# Patient Record
Sex: FEMALE | Race: OTHER | NOT HISPANIC OR LATINO | URBAN - METROPOLITAN AREA
[De-identification: names, ages, dates, MRNs, and addresses within clinical notes are randomized per-mention and may not be internally consistent; named-entity substitution may affect disease eponyms.]

---

## 2018-05-30 ENCOUNTER — EMERGENCY (EMERGENCY)
Facility: HOSPITAL | Age: 78
LOS: 1 days | Discharge: HOME | End: 2018-05-30
Attending: EMERGENCY MEDICINE | Admitting: EMERGENCY MEDICINE
Payer: MEDICARE

## 2018-05-30 VITALS
SYSTOLIC BLOOD PRESSURE: 113 MMHG | TEMPERATURE: 98 F | WEIGHT: 199.96 LBS | HEART RATE: 58 BPM | DIASTOLIC BLOOD PRESSURE: 69 MMHG | OXYGEN SATURATION: 97 % | RESPIRATION RATE: 17 BRPM

## 2018-05-30 VITALS
DIASTOLIC BLOOD PRESSURE: 59 MMHG | RESPIRATION RATE: 17 BRPM | OXYGEN SATURATION: 97 % | HEART RATE: 66 BPM | SYSTOLIC BLOOD PRESSURE: 90 MMHG

## 2018-05-30 DIAGNOSIS — I10 ESSENTIAL (PRIMARY) HYPERTENSION: ICD-10-CM

## 2018-05-30 DIAGNOSIS — I25.110 ATHEROSCLEROTIC HEART DISEASE OF NATIVE CORONARY ARTERY WITH UNSTABLE ANGINA PECTORIS: ICD-10-CM

## 2018-05-30 DIAGNOSIS — M79.662 PAIN IN LEFT LOWER LEG: ICD-10-CM

## 2018-05-30 DIAGNOSIS — I48.92 UNSPECIFIED ATRIAL FLUTTER: ICD-10-CM

## 2018-05-30 DIAGNOSIS — E11.9 TYPE 2 DIABETES MELLITUS WITHOUT COMPLICATIONS: ICD-10-CM

## 2018-05-30 DIAGNOSIS — L03.116 CELLULITIS OF LEFT LOWER LIMB: ICD-10-CM

## 2018-05-30 LAB
ALBUMIN SERPL ELPH-MCNC: 2.9 G/DL — LOW (ref 3.4–5)
ALP SERPL-CCNC: 98 U/L — SIGNIFICANT CHANGE UP (ref 40–120)
ALT FLD-CCNC: 16 U/L — SIGNIFICANT CHANGE UP (ref 12–42)
ANION GAP SERPL CALC-SCNC: 10 MMOL/L — SIGNIFICANT CHANGE UP (ref 9–16)
APTT BLD: 34 SEC — SIGNIFICANT CHANGE UP (ref 27.5–36.5)
AST SERPL-CCNC: 21 U/L — SIGNIFICANT CHANGE UP (ref 15–37)
BASOPHILS NFR BLD AUTO: 0.2 % — SIGNIFICANT CHANGE UP (ref 0–2)
BILIRUB SERPL-MCNC: 0.9 MG/DL — SIGNIFICANT CHANGE UP (ref 0.2–1.2)
BUN SERPL-MCNC: 65 MG/DL — HIGH (ref 7–23)
CALCIUM SERPL-MCNC: 9.6 MG/DL — SIGNIFICANT CHANGE UP (ref 8.5–10.5)
CHLORIDE SERPL-SCNC: 92 MMOL/L — LOW (ref 96–108)
CO2 SERPL-SCNC: 27 MMOL/L — SIGNIFICANT CHANGE UP (ref 22–31)
CREAT SERPL-MCNC: 1.74 MG/DL — HIGH (ref 0.5–1.3)
EOSINOPHIL NFR BLD AUTO: 0.2 % — SIGNIFICANT CHANGE UP (ref 0–6)
GLUCOSE SERPL-MCNC: 131 MG/DL — HIGH (ref 70–99)
HCT VFR BLD CALC: 30.3 % — LOW (ref 34.5–45)
HGB BLD-MCNC: 9.8 G/DL — LOW (ref 11.5–15.5)
IMM GRANULOCYTES NFR BLD AUTO: 0.4 % — SIGNIFICANT CHANGE UP (ref 0–1.5)
INR BLD: 1.2 — HIGH (ref 0.88–1.16)
LACTATE SERPL-SCNC: 1 MMOL/L — SIGNIFICANT CHANGE UP (ref 0.4–2)
LYMPHOCYTES # BLD AUTO: 5.6 % — LOW (ref 13–44)
MCHC RBC-ENTMCNC: 23.9 PG — LOW (ref 27–34)
MCHC RBC-ENTMCNC: 32.3 G/DL — SIGNIFICANT CHANGE UP (ref 32–36)
MCV RBC AUTO: 73.9 FL — LOW (ref 80–100)
MONOCYTES NFR BLD AUTO: 7.3 % — SIGNIFICANT CHANGE UP (ref 2–14)
NEUTROPHILS NFR BLD AUTO: 86.3 % — HIGH (ref 43–77)
PLATELET # BLD AUTO: 255 K/UL — SIGNIFICANT CHANGE UP (ref 150–400)
POTASSIUM SERPL-MCNC: 3.4 MMOL/L — LOW (ref 3.5–5.3)
POTASSIUM SERPL-SCNC: 3.4 MMOL/L — LOW (ref 3.5–5.3)
PROT SERPL-MCNC: 7.8 G/DL — SIGNIFICANT CHANGE UP (ref 6.4–8.2)
PROTHROM AB SERPL-ACNC: 13.2 SEC — HIGH (ref 9.8–12.7)
RBC # BLD: 4.1 M/UL — SIGNIFICANT CHANGE UP (ref 3.8–5.2)
RBC # FLD: 15.7 % — SIGNIFICANT CHANGE UP (ref 10.3–16.9)
SODIUM SERPL-SCNC: 129 MMOL/L — LOW (ref 132–145)
WBC # BLD: 9.6 K/UL — SIGNIFICANT CHANGE UP (ref 3.8–10.5)
WBC # FLD AUTO: 9.6 K/UL — SIGNIFICANT CHANGE UP (ref 3.8–10.5)

## 2018-05-30 PROCEDURE — 73590 X-RAY EXAM OF LOWER LEG: CPT | Mod: 26,50

## 2018-05-30 PROCEDURE — 99285 EMERGENCY DEPT VISIT HI MDM: CPT | Mod: 25

## 2018-05-30 PROCEDURE — 93010 ELECTROCARDIOGRAM REPORT: CPT

## 2018-05-30 RX ORDER — VANCOMYCIN HCL 1 G
1000 VIAL (EA) INTRAVENOUS ONCE
Qty: 0 | Refills: 0 | Status: COMPLETED | OUTPATIENT
Start: 2018-05-30 | End: 2018-05-30

## 2018-05-30 RX ORDER — SODIUM CHLORIDE 9 MG/ML
1000 INJECTION INTRAMUSCULAR; INTRAVENOUS; SUBCUTANEOUS
Qty: 0 | Refills: 0 | Status: DISCONTINUED | OUTPATIENT
Start: 2018-05-30 | End: 2018-06-03

## 2018-05-30 RX ORDER — POTASSIUM CHLORIDE 20 MEQ
20 PACKET (EA) ORAL ONCE
Qty: 0 | Refills: 0 | Status: COMPLETED | OUTPATIENT
Start: 2018-05-30 | End: 2018-05-30

## 2018-05-30 RX ORDER — PIPERACILLIN AND TAZOBACTAM 4; .5 G/20ML; G/20ML
4.5 INJECTION, POWDER, LYOPHILIZED, FOR SOLUTION INTRAVENOUS ONCE
Qty: 0 | Refills: 0 | Status: COMPLETED | OUTPATIENT
Start: 2018-05-30 | End: 2018-05-30

## 2018-05-30 RX ORDER — ASPIRIN/CALCIUM CARB/MAGNESIUM 324 MG
325 TABLET ORAL ONCE
Qty: 0 | Refills: 0 | Status: COMPLETED | OUTPATIENT
Start: 2018-05-30 | End: 2018-05-30

## 2018-05-30 RX ORDER — SODIUM CHLORIDE 9 MG/ML
1000 INJECTION INTRAMUSCULAR; INTRAVENOUS; SUBCUTANEOUS ONCE
Qty: 0 | Refills: 0 | Status: COMPLETED | OUTPATIENT
Start: 2018-05-30 | End: 2018-05-30

## 2018-05-30 RX ADMIN — PIPERACILLIN AND TAZOBACTAM 200 GRAM(S): 4; .5 INJECTION, POWDER, LYOPHILIZED, FOR SOLUTION INTRAVENOUS at 18:23

## 2018-05-30 RX ADMIN — SODIUM CHLORIDE 2000 MILLILITER(S): 9 INJECTION INTRAMUSCULAR; INTRAVENOUS; SUBCUTANEOUS at 20:02

## 2018-05-30 RX ADMIN — SODIUM CHLORIDE 250 MILLILITER(S): 9 INJECTION INTRAMUSCULAR; INTRAVENOUS; SUBCUTANEOUS at 17:16

## 2018-05-30 RX ADMIN — Medication 325 MILLIGRAM(S): at 19:55

## 2018-05-30 RX ADMIN — Medication 20 MILLIEQUIVALENT(S): at 18:22

## 2018-05-30 RX ADMIN — Medication 250 MILLIGRAM(S): at 16:38

## 2018-05-30 NOTE — ED PROVIDER NOTE - PROGRESS NOTE DETAILS
Son is here now who is power of  and primary caretaker. States pt has hx of anemia, hyponatremia, memory loss/Dementia, HTN, HLD, mitral valve replacement, DM. Put a call out for Dr Michelle from [place], spoke to answering service, awaiting call back for dispo. Admit to [facility] vs transfer to [place]. Spoke to [place] transfer center through [facility], case accepted by ED Admin attending Dr Moss. Does of ASA ordered as recommended by ED attending. Primary care all at [place] so power of  (son) would prefer for pt to be admitted at [place].

## 2018-05-30 NOTE — ED PROVIDER NOTE - OBJECTIVE STATEMENT
77 y o female with PMHX of CAD (valve replacement - specific unknown), DM, HTN, depression, and HLD presents to the ED for left leg pain and swelling. Home aid states she has noticed worsening edema in the left lower leg and bilateral leg redness. Pt was placed on abx for 5 - 7 days (Keflex and Doxycycline) with little to no relief. According to the aid, the pt needed assistance to ambulate, but she is now able to move around on her own and perform daily activities. Pt reports experiencing chills and bilateral leg pain. Denies any chest pain, SOB, headaches, numbness, tingling, fever, chills, n/v/d, or any other sx.

## 2018-05-30 NOTE — ED PROVIDER NOTE - SKIN, MLM
RIGHT LE: distal circumferential erythema friable tissue with green discharge, tender to palpation, no crepitus. LEFT LE: distal leg posterior half erythema with friable tissue, green discharge, no crepitus.

## 2018-05-30 NOTE — ED ADULT NURSE REASSESSMENT NOTE - NS ED NURSE REASSESS COMMENT FT1
pt at 19:55 was 94/52 MD made aware of blood pressure, verbal order of 1000mL NS to be given bolus- repeat BP at 20:01 90/59, MD made aware of BP prior to leaving, MD aware and states it is okay to be transferred

## 2018-05-30 NOTE — ED PROVIDER NOTE - MEDICAL DECISION MAKING DETAILS
Pt with significant cellulitis bilateral extremities failed PO treatments as outpatient of Keflex and Oxycycline. Will give abx IV, sepsis labs, most likely to be admitted to medicine for abx. Pt with significant cellulitis bilateral extremities failed PO treatments as outpatient of Keflex and DOxycycline. Will give abx IV, sepsis labs, most likely to be admitted to medicine for abx. Pt's primary care is at [place], Dr Michelle - [phone].

## 2018-05-30 NOTE — ED PROVIDER NOTE - CARE PLAN
Principal Discharge DX:	Cellulitis Principal Discharge DX:	Cellulitis  Secondary Diagnosis:	Atrial flutter by electrocardiogram

## 2018-06-05 LAB
CULTURE RESULTS: SIGNIFICANT CHANGE UP
CULTURE RESULTS: SIGNIFICANT CHANGE UP
SPECIMEN SOURCE: SIGNIFICANT CHANGE UP
SPECIMEN SOURCE: SIGNIFICANT CHANGE UP

## 2019-01-01 ENCOUNTER — EMERGENCY (EMERGENCY)
Facility: HOSPITAL | Age: 79
LOS: 1 days | Discharge: HOME | End: 2019-01-01
Attending: EMERGENCY MEDICINE | Admitting: EMERGENCY MEDICINE
Payer: MEDICARE

## 2019-01-01 ENCOUNTER — INPATIENT (INPATIENT)
Facility: HOSPITAL | Age: 79
LOS: 4 days | Discharge: HOME HEALTH | DRG: 638 | End: 2019-01-23
Attending: INTERNAL MEDICINE | Admitting: INTERNAL MEDICINE
Payer: MEDICARE

## 2019-01-01 VITALS
OXYGEN SATURATION: 96 % | RESPIRATION RATE: 16 BRPM | SYSTOLIC BLOOD PRESSURE: 92 MMHG | DIASTOLIC BLOOD PRESSURE: 61 MMHG | HEART RATE: 77 BPM | TEMPERATURE: 98 F

## 2019-01-01 VITALS — HEIGHT: 62 IN | WEIGHT: 167.99 LBS

## 2019-01-01 VITALS
DIASTOLIC BLOOD PRESSURE: 70 MMHG | OXYGEN SATURATION: 98 % | TEMPERATURE: 98 F | RESPIRATION RATE: 981 BRPM | HEART RATE: 73 BPM | SYSTOLIC BLOOD PRESSURE: 106 MMHG

## 2019-01-01 VITALS
RESPIRATION RATE: 17 BRPM | HEART RATE: 71 BPM | OXYGEN SATURATION: 99 % | TEMPERATURE: 98 F | DIASTOLIC BLOOD PRESSURE: 57 MMHG | SYSTOLIC BLOOD PRESSURE: 111 MMHG

## 2019-01-01 DIAGNOSIS — I48.92 UNSPECIFIED ATRIAL FLUTTER: ICD-10-CM

## 2019-01-01 DIAGNOSIS — D64.9 ANEMIA, UNSPECIFIED: ICD-10-CM

## 2019-01-01 DIAGNOSIS — Z79.899 OTHER LONG TERM (CURRENT) DRUG THERAPY: ICD-10-CM

## 2019-01-01 DIAGNOSIS — I10 ESSENTIAL (PRIMARY) HYPERTENSION: ICD-10-CM

## 2019-01-01 DIAGNOSIS — E11.649 TYPE 2 DIABETES MELLITUS WITH HYPOGLYCEMIA WITHOUT COMA: ICD-10-CM

## 2019-01-01 DIAGNOSIS — Z79.84 LONG TERM (CURRENT) USE OF ORAL HYPOGLYCEMIC DRUGS: ICD-10-CM

## 2019-01-01 DIAGNOSIS — N39.0 URINARY TRACT INFECTION, SITE NOT SPECIFIED: ICD-10-CM

## 2019-01-01 DIAGNOSIS — I46.9 CARDIAC ARREST, CAUSE UNSPECIFIED: ICD-10-CM

## 2019-01-01 DIAGNOSIS — Z95.2 PRESENCE OF PROSTHETIC HEART VALVE: ICD-10-CM

## 2019-01-01 DIAGNOSIS — F03.90 UNSPECIFIED DEMENTIA WITHOUT BEHAVIORAL DISTURBANCE: ICD-10-CM

## 2019-01-01 DIAGNOSIS — Z79.01 LONG TERM (CURRENT) USE OF ANTICOAGULANTS: ICD-10-CM

## 2019-01-01 DIAGNOSIS — E86.0 DEHYDRATION: ICD-10-CM

## 2019-01-01 DIAGNOSIS — Z29.9 ENCOUNTER FOR PROPHYLACTIC MEASURES, UNSPECIFIED: ICD-10-CM

## 2019-01-01 DIAGNOSIS — E11.9 TYPE 2 DIABETES MELLITUS WITHOUT COMPLICATIONS: ICD-10-CM

## 2019-01-01 DIAGNOSIS — Z91.89 OTHER SPECIFIED PERSONAL RISK FACTORS, NOT ELSEWHERE CLASSIFIED: ICD-10-CM

## 2019-01-01 DIAGNOSIS — I25.10 ATHEROSCLEROTIC HEART DISEASE OF NATIVE CORONARY ARTERY WITHOUT ANGINA PECTORIS: ICD-10-CM

## 2019-01-01 DIAGNOSIS — R31.9 HEMATURIA, UNSPECIFIED: ICD-10-CM

## 2019-01-01 DIAGNOSIS — Z79.4 LONG TERM (CURRENT) USE OF INSULIN: ICD-10-CM

## 2019-01-01 DIAGNOSIS — Z95.2 PRESENCE OF PROSTHETIC HEART VALVE: Chronic | ICD-10-CM

## 2019-01-01 DIAGNOSIS — N30.00 ACUTE CYSTITIS WITHOUT HEMATURIA: ICD-10-CM

## 2019-01-01 DIAGNOSIS — R63.8 OTHER SYMPTOMS AND SIGNS CONCERNING FOOD AND FLUID INTAKE: ICD-10-CM

## 2019-01-01 DIAGNOSIS — I48.91 UNSPECIFIED ATRIAL FIBRILLATION: ICD-10-CM

## 2019-01-01 DIAGNOSIS — E11.620 TYPE 2 DIABETES MELLITUS WITH DIABETIC DERMATITIS: ICD-10-CM

## 2019-01-01 DIAGNOSIS — F32.9 MAJOR DEPRESSIVE DISORDER, SINGLE EPISODE, UNSPECIFIED: ICD-10-CM

## 2019-01-01 DIAGNOSIS — R79.89 OTHER SPECIFIED ABNORMAL FINDINGS OF BLOOD CHEMISTRY: ICD-10-CM

## 2019-01-01 DIAGNOSIS — N28.9 DISORDER OF KIDNEY AND URETER, UNSPECIFIED: ICD-10-CM

## 2019-01-01 DIAGNOSIS — N17.9 ACUTE KIDNEY FAILURE, UNSPECIFIED: ICD-10-CM

## 2019-01-01 DIAGNOSIS — E78.5 HYPERLIPIDEMIA, UNSPECIFIED: ICD-10-CM

## 2019-01-01 DIAGNOSIS — E16.2 HYPOGLYCEMIA, UNSPECIFIED: ICD-10-CM

## 2019-01-01 LAB
ALBUMIN SERPL ELPH-MCNC: 3 G/DL — LOW (ref 3.3–5)
ALBUMIN SERPL ELPH-MCNC: 3.3 G/DL — LOW (ref 3.4–5)
ALBUMIN SERPL ELPH-MCNC: 3.3 G/DL — LOW (ref 3.4–5)
ALBUMIN SERPL ELPH-MCNC: 3.3 G/DL — SIGNIFICANT CHANGE UP (ref 3.3–5)
ALP SERPL-CCNC: 66 U/L — SIGNIFICANT CHANGE UP (ref 40–120)
ALP SERPL-CCNC: 67 U/L — SIGNIFICANT CHANGE UP (ref 40–120)
ALP SERPL-CCNC: 69 U/L — SIGNIFICANT CHANGE UP (ref 40–120)
ALP SERPL-CCNC: 79 U/L — SIGNIFICANT CHANGE UP (ref 40–120)
ALT FLD-CCNC: 20 U/L — SIGNIFICANT CHANGE UP (ref 12–42)
ALT FLD-CCNC: 22 U/L — SIGNIFICANT CHANGE UP (ref 12–42)
ALT FLD-CCNC: 23 U/L — SIGNIFICANT CHANGE UP (ref 10–45)
ALT FLD-CCNC: 32 U/L — SIGNIFICANT CHANGE UP (ref 10–45)
ANION GAP SERPL CALC-SCNC: 10 MMOL/L — SIGNIFICANT CHANGE UP (ref 5–17)
ANION GAP SERPL CALC-SCNC: 10 MMOL/L — SIGNIFICANT CHANGE UP (ref 5–17)
ANION GAP SERPL CALC-SCNC: 11 MMOL/L — SIGNIFICANT CHANGE UP (ref 5–17)
ANION GAP SERPL CALC-SCNC: 11 MMOL/L — SIGNIFICANT CHANGE UP (ref 9–16)
ANION GAP SERPL CALC-SCNC: 12 MMOL/L — SIGNIFICANT CHANGE UP (ref 5–17)
ANION GAP SERPL CALC-SCNC: 13 MMOL/L — SIGNIFICANT CHANGE UP (ref 5–17)
ANION GAP SERPL CALC-SCNC: 13 MMOL/L — SIGNIFICANT CHANGE UP (ref 9–16)
ANION GAP SERPL CALC-SCNC: 14 MMOL/L — SIGNIFICANT CHANGE UP (ref 5–17)
APPEARANCE UR: CLEAR — SIGNIFICANT CHANGE UP
APTT BLD: 34.8 SEC — SIGNIFICANT CHANGE UP (ref 27.5–36.3)
APTT BLD: 42.8 SEC — HIGH (ref 27.5–36.3)
APTT BLD: 56.3 SEC — HIGH (ref 27.5–36.3)
APTT BLD: 58.2 SEC — HIGH (ref 27.5–36.3)
APTT BLD: 62.7 SEC — HIGH (ref 27.5–36.3)
APTT BLD: 66.5 SEC — HIGH (ref 27.5–36.3)
APTT BLD: 67.4 SEC — HIGH (ref 27.5–36.3)
APTT BLD: 69.4 SEC — HIGH (ref 27.5–36.3)
APTT BLD: 77.9 SEC — HIGH (ref 27.5–36.3)
APTT BLD: 79.8 SEC — HIGH (ref 27.5–36.3)
APTT BLD: 81.5 SEC — HIGH (ref 27.5–36.3)
AST SERPL-CCNC: 40 U/L — SIGNIFICANT CHANGE UP (ref 10–40)
AST SERPL-CCNC: 44 U/L — HIGH (ref 10–40)
AST SERPL-CCNC: 44 U/L — HIGH (ref 15–37)
AST SERPL-CCNC: 50 U/L — HIGH (ref 15–37)
BASOPHILS NFR BLD AUTO: 0.4 % — SIGNIFICANT CHANGE UP (ref 0–2)
BILIRUB DIRECT SERPL-MCNC: 0.2 MG/DL — SIGNIFICANT CHANGE UP (ref 0–0.2)
BILIRUB DIRECT SERPL-MCNC: 0.3 MG/DL — HIGH
BILIRUB INDIRECT FLD-MCNC: 1.1 MG/DL — HIGH (ref 0.2–1)
BILIRUB SERPL-MCNC: 1.2 MG/DL — SIGNIFICANT CHANGE UP (ref 0.2–1.2)
BILIRUB SERPL-MCNC: 1.3 MG/DL — HIGH (ref 0.2–1.2)
BILIRUB UR-MCNC: NEGATIVE — SIGNIFICANT CHANGE UP
BLD GP AB SCN SERPL QL: NEGATIVE — SIGNIFICANT CHANGE UP
BUN SERPL-MCNC: 22 MG/DL — SIGNIFICANT CHANGE UP (ref 7–23)
BUN SERPL-MCNC: 24 MG/DL — HIGH (ref 7–23)
BUN SERPL-MCNC: 25 MG/DL — HIGH (ref 7–23)
BUN SERPL-MCNC: 34 MG/DL — HIGH (ref 7–23)
BUN SERPL-MCNC: 35 MG/DL — HIGH (ref 7–23)
BUN SERPL-MCNC: 41 MG/DL — HIGH (ref 7–23)
C PEPTIDE SERPL-MCNC: 1.9 NG/ML — SIGNIFICANT CHANGE UP (ref 0.9–7.1)
CALCIUM SERPL-MCNC: 8.5 MG/DL — SIGNIFICANT CHANGE UP (ref 8.4–10.5)
CALCIUM SERPL-MCNC: 8.6 MG/DL — SIGNIFICANT CHANGE UP (ref 8.4–10.5)
CALCIUM SERPL-MCNC: 8.9 MG/DL — SIGNIFICANT CHANGE UP (ref 8.4–10.5)
CALCIUM SERPL-MCNC: 9.1 MG/DL — SIGNIFICANT CHANGE UP (ref 8.4–10.5)
CALCIUM SERPL-MCNC: 9.2 MG/DL — SIGNIFICANT CHANGE UP (ref 8.4–10.5)
CALCIUM SERPL-MCNC: 9.2 MG/DL — SIGNIFICANT CHANGE UP (ref 8.5–10.5)
CALCIUM SERPL-MCNC: 9.3 MG/DL — SIGNIFICANT CHANGE UP (ref 8.4–10.5)
CALCIUM SERPL-MCNC: 9.3 MG/DL — SIGNIFICANT CHANGE UP (ref 8.5–10.5)
CHLORIDE SERPL-SCNC: 100 MMOL/L — SIGNIFICANT CHANGE UP (ref 96–108)
CHLORIDE SERPL-SCNC: 100 MMOL/L — SIGNIFICANT CHANGE UP (ref 96–108)
CHLORIDE SERPL-SCNC: 102 MMOL/L — SIGNIFICANT CHANGE UP (ref 96–108)
CHLORIDE SERPL-SCNC: 102 MMOL/L — SIGNIFICANT CHANGE UP (ref 96–108)
CHLORIDE SERPL-SCNC: 103 MMOL/L — SIGNIFICANT CHANGE UP (ref 96–108)
CHLORIDE SERPL-SCNC: 103 MMOL/L — SIGNIFICANT CHANGE UP (ref 96–108)
CHLORIDE SERPL-SCNC: 97 MMOL/L — SIGNIFICANT CHANGE UP (ref 96–108)
CHLORIDE SERPL-SCNC: 99 MMOL/L — SIGNIFICANT CHANGE UP (ref 96–108)
CK SERPL-CCNC: 134 U/L — SIGNIFICANT CHANGE UP (ref 26–192)
CK SERPL-CCNC: 50 U/L — SIGNIFICANT CHANGE UP (ref 26–192)
CO2 SERPL-SCNC: 23 MMOL/L — SIGNIFICANT CHANGE UP (ref 22–31)
CO2 SERPL-SCNC: 23 MMOL/L — SIGNIFICANT CHANGE UP (ref 22–31)
CO2 SERPL-SCNC: 24 MMOL/L — SIGNIFICANT CHANGE UP (ref 22–31)
CO2 SERPL-SCNC: 24 MMOL/L — SIGNIFICANT CHANGE UP (ref 22–31)
CO2 SERPL-SCNC: 26 MMOL/L — SIGNIFICANT CHANGE UP (ref 22–31)
CO2 SERPL-SCNC: 26 MMOL/L — SIGNIFICANT CHANGE UP (ref 22–31)
CO2 SERPL-SCNC: 28 MMOL/L — SIGNIFICANT CHANGE UP (ref 22–31)
CO2 SERPL-SCNC: 28 MMOL/L — SIGNIFICANT CHANGE UP (ref 22–31)
COLOR SPEC: YELLOW — SIGNIFICANT CHANGE UP
CORTICOSTEROID BINDING GLOBULIN RESULT: 1.9 MG/DL — SIGNIFICANT CHANGE UP
CORTIS F/TOTAL MFR SERPL: 4.3 % — SIGNIFICANT CHANGE UP
CORTIS SERPL-MCNC: 5.3 UG/DL — SIGNIFICANT CHANGE UP
CORTISOL, FREE RESULT: 0.23 UG/DL — SIGNIFICANT CHANGE UP
CREAT SERPL-MCNC: 0.98 MG/DL — SIGNIFICANT CHANGE UP (ref 0.5–1.3)
CREAT SERPL-MCNC: 1.01 MG/DL — SIGNIFICANT CHANGE UP (ref 0.5–1.3)
CREAT SERPL-MCNC: 1.02 MG/DL — SIGNIFICANT CHANGE UP (ref 0.5–1.3)
CREAT SERPL-MCNC: 1.02 MG/DL — SIGNIFICANT CHANGE UP (ref 0.5–1.3)
CREAT SERPL-MCNC: 1.13 MG/DL — SIGNIFICANT CHANGE UP (ref 0.5–1.3)
CREAT SERPL-MCNC: 1.18 MG/DL — SIGNIFICANT CHANGE UP (ref 0.5–1.3)
CREAT SERPL-MCNC: 1.31 MG/DL — HIGH (ref 0.5–1.3)
CREAT SERPL-MCNC: 1.47 MG/DL — HIGH (ref 0.5–1.3)
CULTURE RESULTS: SIGNIFICANT CHANGE UP
DAT POLY-SP REAG RBC QL: NEGATIVE — SIGNIFICANT CHANGE UP
DIFF PNL FLD: ABNORMAL
EOSINOPHIL NFR BLD AUTO: 0.9 % — SIGNIFICANT CHANGE UP (ref 0–6)
ETHANOL SERPL-MCNC: <3 MG/DL — SIGNIFICANT CHANGE UP
FERRITIN SERPL-MCNC: 107 NG/ML — SIGNIFICANT CHANGE UP (ref 15–150)
FRUCTOSAMINE SERPL-MCNC: 207 UMOL/L — SIGNIFICANT CHANGE UP (ref 205–285)
GLUCOSE BLDC GLUCOMTR-MCNC: 139 MG/DL — HIGH (ref 70–99)
GLUCOSE BLDC GLUCOMTR-MCNC: 145 MG/DL — HIGH (ref 70–99)
GLUCOSE BLDC GLUCOMTR-MCNC: 155 MG/DL — HIGH (ref 70–99)
GLUCOSE BLDC GLUCOMTR-MCNC: 161 MG/DL — HIGH (ref 70–99)
GLUCOSE BLDC GLUCOMTR-MCNC: 173 MG/DL — HIGH (ref 70–99)
GLUCOSE BLDC GLUCOMTR-MCNC: 178 MG/DL — HIGH (ref 70–99)
GLUCOSE BLDC GLUCOMTR-MCNC: 182 MG/DL — HIGH (ref 70–99)
GLUCOSE BLDC GLUCOMTR-MCNC: 183 MG/DL — HIGH (ref 70–99)
GLUCOSE BLDC GLUCOMTR-MCNC: 190 MG/DL — HIGH (ref 70–99)
GLUCOSE BLDC GLUCOMTR-MCNC: 205 MG/DL — HIGH (ref 70–99)
GLUCOSE BLDC GLUCOMTR-MCNC: 209 MG/DL — HIGH (ref 70–99)
GLUCOSE BLDC GLUCOMTR-MCNC: 210 MG/DL — HIGH (ref 70–99)
GLUCOSE BLDC GLUCOMTR-MCNC: 237 MG/DL — HIGH (ref 70–99)
GLUCOSE BLDC GLUCOMTR-MCNC: 250 MG/DL — HIGH (ref 70–99)
GLUCOSE BLDC GLUCOMTR-MCNC: 253 MG/DL — HIGH (ref 70–99)
GLUCOSE BLDC GLUCOMTR-MCNC: 254 MG/DL — HIGH (ref 70–99)
GLUCOSE BLDC GLUCOMTR-MCNC: 255 MG/DL — HIGH (ref 70–99)
GLUCOSE BLDC GLUCOMTR-MCNC: 274 MG/DL — HIGH (ref 70–99)
GLUCOSE BLDC GLUCOMTR-MCNC: 275 MG/DL — HIGH (ref 70–99)
GLUCOSE BLDC GLUCOMTR-MCNC: 299 MG/DL — HIGH (ref 70–99)
GLUCOSE BLDC GLUCOMTR-MCNC: 301 MG/DL — HIGH (ref 70–99)
GLUCOSE BLDC GLUCOMTR-MCNC: 86 MG/DL — SIGNIFICANT CHANGE UP (ref 70–99)
GLUCOSE SERPL-MCNC: 156 MG/DL — HIGH (ref 70–99)
GLUCOSE SERPL-MCNC: 161 MG/DL — HIGH (ref 70–99)
GLUCOSE SERPL-MCNC: 165 MG/DL — HIGH (ref 70–99)
GLUCOSE SERPL-MCNC: 172 MG/DL — HIGH (ref 70–99)
GLUCOSE SERPL-MCNC: 176 MG/DL — HIGH (ref 70–99)
GLUCOSE SERPL-MCNC: 181 MG/DL — HIGH (ref 70–99)
GLUCOSE SERPL-MCNC: 187 MG/DL — HIGH (ref 70–99)
GLUCOSE SERPL-MCNC: 22 MG/DL — CRITICAL LOW (ref 70–99)
GLUCOSE UR QL: NEGATIVE — SIGNIFICANT CHANGE UP
HAPTOGLOB SERPL-MCNC: <10 MG/DL — LOW (ref 34–200)
HBA1C BLD-MCNC: 4.9 % — SIGNIFICANT CHANGE UP (ref 4–5.6)
HCT VFR BLD CALC: 27.7 % — LOW (ref 34.5–45)
HCT VFR BLD CALC: 27.9 % — LOW (ref 34.5–45)
HCT VFR BLD CALC: 28 % — LOW (ref 34.5–45)
HCT VFR BLD CALC: 29.6 % — LOW (ref 34.5–45)
HCT VFR BLD CALC: 30.2 % — LOW (ref 34.5–45)
HCT VFR BLD CALC: 32.4 % — LOW (ref 34.5–45)
HCT VFR BLD CALC: 32.7 % — LOW (ref 34.5–45)
HCT VFR BLD CALC: 33.2 % — LOW (ref 34.5–45)
HCT VFR BLD CALC: 33.5 % — LOW (ref 34.5–45)
HGB BLD-MCNC: 10.4 G/DL — LOW (ref 11.5–15.5)
HGB BLD-MCNC: 10.5 G/DL — LOW (ref 11.5–15.5)
HGB BLD-MCNC: 10.6 G/DL — LOW (ref 11.5–15.5)
HGB BLD-MCNC: 10.6 G/DL — LOW (ref 11.5–15.5)
HGB BLD-MCNC: 8.9 G/DL — LOW (ref 11.5–15.5)
HGB BLD-MCNC: 8.9 G/DL — LOW (ref 11.5–15.5)
HGB BLD-MCNC: 9.1 G/DL — LOW (ref 11.5–15.5)
HGB BLD-MCNC: 9.3 G/DL — LOW (ref 11.5–15.5)
HGB BLD-MCNC: 9.5 G/DL — LOW (ref 11.5–15.5)
IMM GRANULOCYTES NFR BLD AUTO: 1.2 % — SIGNIFICANT CHANGE UP (ref 0–1.5)
INR BLD: 1.68 — HIGH (ref 0.88–1.16)
INR BLD: 2.18 — HIGH (ref 0.88–1.16)
INR BLD: 2.54 — HIGH (ref 0.88–1.16)
INR BLD: 2.57 — HIGH (ref 0.88–1.16)
INR BLD: 2.73 — HIGH (ref 0.88–1.16)
INR BLD: 2.79 — HIGH (ref 0.88–1.16)
IRON SATN MFR SERPL: 15 % — SIGNIFICANT CHANGE UP (ref 14–50)
IRON SATN MFR SERPL: 34 UG/DL — SIGNIFICANT CHANGE UP (ref 30–160)
KETONES UR-MCNC: NEGATIVE — SIGNIFICANT CHANGE UP
LACTATE SERPL-SCNC: 1.7 MMOL/L — SIGNIFICANT CHANGE UP (ref 0.5–2)
LACTATE SERPL-SCNC: 2.9 MMOL/L — HIGH (ref 0.4–2)
LACTATE SERPL-SCNC: 2.9 MMOL/L — HIGH (ref 0.5–2)
LACTATE SERPL-SCNC: 3.6 MMOL/L — HIGH (ref 0.4–2)
LDH SERPL L TO P-CCNC: 610 U/L — HIGH (ref 50–242)
LEUKOCYTE ESTERASE UR-ACNC: ABNORMAL
LIDOCAIN IGE QN: 176 U/L — SIGNIFICANT CHANGE UP (ref 73–393)
LYMPHOCYTES # BLD AUTO: 13.7 % — SIGNIFICANT CHANGE UP (ref 13–44)
MAGNESIUM SERPL-MCNC: 1.2 MG/DL — LOW (ref 1.6–2.6)
MAGNESIUM SERPL-MCNC: 1.5 MG/DL — LOW (ref 1.6–2.6)
MAGNESIUM SERPL-MCNC: 1.8 MG/DL — SIGNIFICANT CHANGE UP (ref 1.6–2.6)
MAGNESIUM SERPL-MCNC: 1.8 MG/DL — SIGNIFICANT CHANGE UP (ref 1.6–2.6)
MAGNESIUM SERPL-MCNC: 2.6 MG/DL — SIGNIFICANT CHANGE UP (ref 1.6–2.6)
MCHC RBC-ENTMCNC: 26.6 PG — LOW (ref 27–34)
MCHC RBC-ENTMCNC: 26.7 PG — LOW (ref 27–34)
MCHC RBC-ENTMCNC: 26.8 PG — LOW (ref 27–34)
MCHC RBC-ENTMCNC: 27 PG — SIGNIFICANT CHANGE UP (ref 27–34)
MCHC RBC-ENTMCNC: 27.1 PG — SIGNIFICANT CHANGE UP (ref 27–34)
MCHC RBC-ENTMCNC: 27.2 PG — SIGNIFICANT CHANGE UP (ref 27–34)
MCHC RBC-ENTMCNC: 27.5 PG — SIGNIFICANT CHANGE UP (ref 27–34)
MCHC RBC-ENTMCNC: 27.6 PG — SIGNIFICANT CHANGE UP (ref 27–34)
MCHC RBC-ENTMCNC: 27.7 PG — SIGNIFICANT CHANGE UP (ref 27–34)
MCHC RBC-ENTMCNC: 31.4 G/DL — LOW (ref 32–36)
MCHC RBC-ENTMCNC: 31.5 G/DL — LOW (ref 32–36)
MCHC RBC-ENTMCNC: 31.6 G/DL — LOW (ref 32–36)
MCHC RBC-ENTMCNC: 31.9 G/DL — LOW (ref 32–36)
MCHC RBC-ENTMCNC: 31.9 G/DL — LOW (ref 32–36)
MCHC RBC-ENTMCNC: 32.1 G/DL — SIGNIFICANT CHANGE UP (ref 32–36)
MCHC RBC-ENTMCNC: 32.5 G/DL — SIGNIFICANT CHANGE UP (ref 32–36)
MCV RBC AUTO: 82.4 FL — SIGNIFICANT CHANGE UP (ref 80–100)
MCV RBC AUTO: 83.5 FL — SIGNIFICANT CHANGE UP (ref 80–100)
MCV RBC AUTO: 84.4 FL — SIGNIFICANT CHANGE UP (ref 80–100)
MCV RBC AUTO: 85.1 FL — SIGNIFICANT CHANGE UP (ref 80–100)
MCV RBC AUTO: 85.8 FL — SIGNIFICANT CHANGE UP (ref 80–100)
MCV RBC AUTO: 85.8 FL — SIGNIFICANT CHANGE UP (ref 80–100)
MCV RBC AUTO: 85.9 FL — SIGNIFICANT CHANGE UP (ref 80–100)
MCV RBC AUTO: 86.2 FL — SIGNIFICANT CHANGE UP (ref 80–100)
MCV RBC AUTO: 86.3 FL — SIGNIFICANT CHANGE UP (ref 80–100)
METFORMIN LEVEL REPORTING LIMIT: 0.1 MCG/ML — SIGNIFICANT CHANGE UP
METFORMIN SERPL-MCNC: 2.5 MCG/ML — SIGNIFICANT CHANGE UP
MONOCYTES NFR BLD AUTO: 7.1 % — SIGNIFICANT CHANGE UP (ref 2–14)
NEUTROPHILS NFR BLD AUTO: 76.7 % — SIGNIFICANT CHANGE UP (ref 43–77)
NITRITE UR-MCNC: NEGATIVE — SIGNIFICANT CHANGE UP
OB PNL STL: NEGATIVE — SIGNIFICANT CHANGE UP
PH UR: 7.5 — SIGNIFICANT CHANGE UP (ref 5–8)
PHOSPHATE SERPL-MCNC: 3.2 MG/DL — SIGNIFICANT CHANGE UP (ref 2.5–4.5)
PLATELET # BLD AUTO: 208 K/UL — SIGNIFICANT CHANGE UP (ref 150–400)
PLATELET # BLD AUTO: 229 K/UL — SIGNIFICANT CHANGE UP (ref 150–400)
PLATELET # BLD AUTO: 230 K/UL — SIGNIFICANT CHANGE UP (ref 150–400)
PLATELET # BLD AUTO: 243 K/UL — SIGNIFICANT CHANGE UP (ref 150–400)
PLATELET # BLD AUTO: 244 K/UL — SIGNIFICANT CHANGE UP (ref 150–400)
PLATELET # BLD AUTO: 252 K/UL — SIGNIFICANT CHANGE UP (ref 150–400)
PLATELET # BLD AUTO: 283 K/UL — SIGNIFICANT CHANGE UP (ref 150–400)
PLATELET # BLD AUTO: 284 K/UL — SIGNIFICANT CHANGE UP (ref 150–400)
PLATELET # BLD AUTO: 300 K/UL — SIGNIFICANT CHANGE UP (ref 150–400)
POTASSIUM SERPL-MCNC: 3.9 MMOL/L — SIGNIFICANT CHANGE UP (ref 3.5–5.3)
POTASSIUM SERPL-MCNC: 3.9 MMOL/L — SIGNIFICANT CHANGE UP (ref 3.5–5.3)
POTASSIUM SERPL-MCNC: 4.4 MMOL/L — SIGNIFICANT CHANGE UP (ref 3.5–5.3)
POTASSIUM SERPL-MCNC: 4.5 MMOL/L — SIGNIFICANT CHANGE UP (ref 3.5–5.3)
POTASSIUM SERPL-MCNC: 4.5 MMOL/L — SIGNIFICANT CHANGE UP (ref 3.5–5.3)
POTASSIUM SERPL-MCNC: 4.8 MMOL/L — SIGNIFICANT CHANGE UP (ref 3.5–5.3)
POTASSIUM SERPL-SCNC: 3.9 MMOL/L — SIGNIFICANT CHANGE UP (ref 3.5–5.3)
POTASSIUM SERPL-SCNC: 3.9 MMOL/L — SIGNIFICANT CHANGE UP (ref 3.5–5.3)
POTASSIUM SERPL-SCNC: 4.4 MMOL/L — SIGNIFICANT CHANGE UP (ref 3.5–5.3)
POTASSIUM SERPL-SCNC: 4.5 MMOL/L — SIGNIFICANT CHANGE UP (ref 3.5–5.3)
POTASSIUM SERPL-SCNC: 4.5 MMOL/L — SIGNIFICANT CHANGE UP (ref 3.5–5.3)
POTASSIUM SERPL-SCNC: 4.8 MMOL/L — SIGNIFICANT CHANGE UP (ref 3.5–5.3)
PROINSULIN SERPL-MCNC: 2.8 PMOL/L — SIGNIFICANT CHANGE UP (ref 0–10)
PROINSULIN SERPL-MCNC: 4.8 PMOL/L — SIGNIFICANT CHANGE UP (ref 0–10)
PROT SERPL-MCNC: 6.4 G/DL — SIGNIFICANT CHANGE UP (ref 6–8.3)
PROT SERPL-MCNC: 6.7 G/DL — SIGNIFICANT CHANGE UP (ref 6–8.3)
PROT SERPL-MCNC: 7.4 G/DL — SIGNIFICANT CHANGE UP (ref 6.4–8.2)
PROT SERPL-MCNC: 7.5 G/DL — SIGNIFICANT CHANGE UP (ref 6.4–8.2)
PROT UR-MCNC: NEGATIVE MG/DL — SIGNIFICANT CHANGE UP
PROTHROM AB SERPL-ACNC: 19 SEC — HIGH (ref 10–12.9)
PROTHROM AB SERPL-ACNC: 25.2 SEC — HIGH (ref 10–12.9)
PROTHROM AB SERPL-ACNC: 29.6 SEC — HIGH (ref 10–12.9)
PROTHROM AB SERPL-ACNC: 29.9 SEC — HIGH (ref 10–12.9)
PROTHROM AB SERPL-ACNC: 31.8 SEC — HIGH (ref 10–12.9)
PROTHROM AB SERPL-ACNC: 32.5 SEC — HIGH (ref 10–12.9)
RBC # BLD: 3.21 M/UL — LOW (ref 3.8–5.2)
RBC # BLD: 3.3 M/UL — LOW (ref 3.8–5.2)
RBC # BLD: 3.34 M/UL — LOW (ref 3.8–5.2)
RBC # BLD: 3.4 M/UL — LOW (ref 3.8–5.2)
RBC # BLD: 3.48 M/UL — LOW (ref 3.8–5.2)
RBC # BLD: 3.52 M/UL — LOW (ref 3.8–5.2)
RBC # BLD: 3.81 M/UL — SIGNIFICANT CHANGE UP (ref 3.8–5.2)
RBC # BLD: 3.84 M/UL — SIGNIFICANT CHANGE UP (ref 3.8–5.2)
RBC # BLD: 3.85 M/UL — SIGNIFICANT CHANGE UP (ref 3.8–5.2)
RBC # BLD: 3.9 M/UL — SIGNIFICANT CHANGE UP (ref 3.8–5.2)
RBC # FLD: 14.9 % — HIGH (ref 10.3–14.5)
RBC # FLD: 15 % — HIGH (ref 10.3–14.5)
RBC # FLD: 15.1 % — SIGNIFICANT CHANGE UP (ref 10.3–16.9)
RBC # FLD: 15.1 % — SIGNIFICANT CHANGE UP (ref 10.3–16.9)
RBC # FLD: 15.2 % — SIGNIFICANT CHANGE UP (ref 10.3–16.9)
RBC # FLD: 15.3 % — SIGNIFICANT CHANGE UP (ref 10.3–16.9)
RBC # FLD: 15.5 % — SIGNIFICANT CHANGE UP (ref 10.3–16.9)
RETICS/RBC NFR: 2.7 % — HIGH (ref 0.5–2.5)
RH IG SCN BLD-IMP: POSITIVE — SIGNIFICANT CHANGE UP
SODIUM SERPL-SCNC: 136 MMOL/L — SIGNIFICANT CHANGE UP (ref 132–145)
SODIUM SERPL-SCNC: 136 MMOL/L — SIGNIFICANT CHANGE UP (ref 135–145)
SODIUM SERPL-SCNC: 137 MMOL/L — SIGNIFICANT CHANGE UP (ref 135–145)
SODIUM SERPL-SCNC: 137 MMOL/L — SIGNIFICANT CHANGE UP (ref 135–145)
SODIUM SERPL-SCNC: 138 MMOL/L — SIGNIFICANT CHANGE UP (ref 135–145)
SODIUM SERPL-SCNC: 138 MMOL/L — SIGNIFICANT CHANGE UP (ref 135–145)
SODIUM SERPL-SCNC: 139 MMOL/L — SIGNIFICANT CHANGE UP (ref 135–145)
SODIUM SERPL-SCNC: 141 MMOL/L — SIGNIFICANT CHANGE UP (ref 132–145)
SP GR SPEC: 1.01 — SIGNIFICANT CHANGE UP (ref 1–1.03)
SPECIMEN SOURCE: SIGNIFICANT CHANGE UP
SULFONYLUREA SERPL-MCNC: SIGNIFICANT CHANGE UP
TIBC SERPL-MCNC: 221 UG/DL — SIGNIFICANT CHANGE UP (ref 220–430)
TRANSFERRIN SERPL-MCNC: 188 MG/DL — LOW (ref 200–360)
TROPONIN I SERPL-MCNC: 0.02 NG/ML — SIGNIFICANT CHANGE UP (ref 0.02–0.06)
TROPONIN I SERPL-MCNC: <0.017 NG/ML — LOW (ref 0.02–0.06)
TSH SERPL-MCNC: 3.06 UIU/ML — SIGNIFICANT CHANGE UP (ref 0.36–3.74)
TSH SERPL-MCNC: 3.67 UIU/ML — SIGNIFICANT CHANGE UP (ref 0.36–3.74)
UIBC SERPL-MCNC: 187 UG/DL — SIGNIFICANT CHANGE UP (ref 110–370)
UROBILINOGEN FLD QL: 0.2 E.U./DL — SIGNIFICANT CHANGE UP
WBC # BLD: 6.9 K/UL — SIGNIFICANT CHANGE UP (ref 3.8–10.5)
WBC # BLD: 6.9 K/UL — SIGNIFICANT CHANGE UP (ref 3.8–10.5)
WBC # BLD: 7.4 K/UL — SIGNIFICANT CHANGE UP (ref 3.8–10.5)
WBC # BLD: 8.1 K/UL — SIGNIFICANT CHANGE UP (ref 3.8–10.5)
WBC # BLD: 8.1 K/UL — SIGNIFICANT CHANGE UP (ref 3.8–10.5)
WBC # BLD: 8.3 K/UL — SIGNIFICANT CHANGE UP (ref 3.8–10.5)
WBC # BLD: 8.3 K/UL — SIGNIFICANT CHANGE UP (ref 3.8–10.5)
WBC # BLD: 9.2 K/UL — SIGNIFICANT CHANGE UP (ref 3.8–10.5)
WBC # BLD: 9.2 K/UL — SIGNIFICANT CHANGE UP (ref 3.8–10.5)
WBC # FLD AUTO: 6.9 K/UL — SIGNIFICANT CHANGE UP (ref 3.8–10.5)
WBC # FLD AUTO: 6.9 K/UL — SIGNIFICANT CHANGE UP (ref 3.8–10.5)
WBC # FLD AUTO: 7.4 K/UL — SIGNIFICANT CHANGE UP (ref 3.8–10.5)
WBC # FLD AUTO: 8.1 K/UL — SIGNIFICANT CHANGE UP (ref 3.8–10.5)
WBC # FLD AUTO: 8.1 K/UL — SIGNIFICANT CHANGE UP (ref 3.8–10.5)
WBC # FLD AUTO: 8.3 K/UL — SIGNIFICANT CHANGE UP (ref 3.8–10.5)
WBC # FLD AUTO: 8.3 K/UL — SIGNIFICANT CHANGE UP (ref 3.8–10.5)
WBC # FLD AUTO: 9.2 K/UL — SIGNIFICANT CHANGE UP (ref 3.8–10.5)
WBC # FLD AUTO: 9.2 K/UL — SIGNIFICANT CHANGE UP (ref 3.8–10.5)

## 2019-01-01 PROCEDURE — 82248 BILIRUBIN DIRECT: CPT

## 2019-01-01 PROCEDURE — 97530 THERAPEUTIC ACTIVITIES: CPT

## 2019-01-01 PROCEDURE — 82985 ASSAY OF GLYCATED PROTEIN: CPT

## 2019-01-01 PROCEDURE — 99233 SBSQ HOSP IP/OBS HIGH 50: CPT | Mod: GC

## 2019-01-01 PROCEDURE — 86901 BLOOD TYPING SEROLOGIC RH(D): CPT

## 2019-01-01 PROCEDURE — 99239 HOSP IP/OBS DSCHRG MGMT >30: CPT

## 2019-01-01 PROCEDURE — 86850 RBC ANTIBODY SCREEN: CPT

## 2019-01-01 PROCEDURE — 85610 PROTHROMBIN TIME: CPT

## 2019-01-01 PROCEDURE — 85045 AUTOMATED RETICULOCYTE COUNT: CPT

## 2019-01-01 PROCEDURE — 81001 URINALYSIS AUTO W/SCOPE: CPT

## 2019-01-01 PROCEDURE — 84100 ASSAY OF PHOSPHORUS: CPT

## 2019-01-01 PROCEDURE — 99218: CPT | Mod: 25

## 2019-01-01 PROCEDURE — 86900 BLOOD TYPING SEROLOGIC ABO: CPT

## 2019-01-01 PROCEDURE — 93010 ELECTROCARDIOGRAM REPORT: CPT

## 2019-01-01 PROCEDURE — 82550 ASSAY OF CK (CPK): CPT

## 2019-01-01 PROCEDURE — 71045 X-RAY EXAM CHEST 1 VIEW: CPT | Mod: 26

## 2019-01-01 PROCEDURE — 85027 COMPLETE CBC AUTOMATED: CPT

## 2019-01-01 PROCEDURE — 84484 ASSAY OF TROPONIN QUANT: CPT

## 2019-01-01 PROCEDURE — 84681 ASSAY OF C-PEPTIDE: CPT

## 2019-01-01 PROCEDURE — 99223 1ST HOSP IP/OBS HIGH 75: CPT | Mod: GC

## 2019-01-01 PROCEDURE — 80307 DRUG TEST PRSMV CHEM ANLYZR: CPT

## 2019-01-01 PROCEDURE — 83010 ASSAY OF HAPTOGLOBIN QUANT: CPT

## 2019-01-01 PROCEDURE — 97161 PT EVAL LOW COMPLEX 20 MIN: CPT

## 2019-01-01 PROCEDURE — 85730 THROMBOPLASTIN TIME PARTIAL: CPT

## 2019-01-01 PROCEDURE — 83735 ASSAY OF MAGNESIUM: CPT

## 2019-01-01 PROCEDURE — G0378: CPT

## 2019-01-01 PROCEDURE — 82962 GLUCOSE BLOOD TEST: CPT

## 2019-01-01 PROCEDURE — 83550 IRON BINDING TEST: CPT

## 2019-01-01 PROCEDURE — 85025 COMPLETE CBC W/AUTO DIFF WBC: CPT

## 2019-01-01 PROCEDURE — 83605 ASSAY OF LACTIC ACID: CPT

## 2019-01-01 PROCEDURE — 80375 DRUG/SUBSTANCE NOS 1-3: CPT

## 2019-01-01 PROCEDURE — 82728 ASSAY OF FERRITIN: CPT

## 2019-01-01 PROCEDURE — 86880 COOMBS TEST DIRECT: CPT

## 2019-01-01 PROCEDURE — 83690 ASSAY OF LIPASE: CPT

## 2019-01-01 PROCEDURE — 83615 LACTATE (LD) (LDH) ENZYME: CPT

## 2019-01-01 PROCEDURE — 82272 OCCULT BLD FECES 1-3 TESTS: CPT

## 2019-01-01 PROCEDURE — 99285 EMERGENCY DEPT VISIT HI MDM: CPT | Mod: 25

## 2019-01-01 PROCEDURE — 87086 URINE CULTURE/COLONY COUNT: CPT

## 2019-01-01 PROCEDURE — 83036 HEMOGLOBIN GLYCOSYLATED A1C: CPT

## 2019-01-01 PROCEDURE — 99284 EMERGENCY DEPT VISIT MOD MDM: CPT | Mod: 25

## 2019-01-01 PROCEDURE — 36415 COLL VENOUS BLD VENIPUNCTURE: CPT

## 2019-01-01 PROCEDURE — 84443 ASSAY THYROID STIM HORMONE: CPT

## 2019-01-01 PROCEDURE — 96375 TX/PRO/DX INJ NEW DRUG ADDON: CPT

## 2019-01-01 PROCEDURE — 71045 X-RAY EXAM CHEST 1 VIEW: CPT

## 2019-01-01 PROCEDURE — 84206 ASSAY OF PROINSULIN: CPT

## 2019-01-01 PROCEDURE — 80053 COMPREHEN METABOLIC PANEL: CPT

## 2019-01-01 PROCEDURE — 83540 ASSAY OF IRON: CPT

## 2019-01-01 PROCEDURE — 92950 HEART/LUNG RESUSCITATION CPR: CPT

## 2019-01-01 PROCEDURE — 80076 HEPATIC FUNCTION PANEL: CPT

## 2019-01-01 PROCEDURE — 96374 THER/PROPH/DIAG INJ IV PUSH: CPT

## 2019-01-01 PROCEDURE — 93005 ELECTROCARDIOGRAM TRACING: CPT

## 2019-01-01 PROCEDURE — 84466 ASSAY OF TRANSFERRIN: CPT

## 2019-01-01 PROCEDURE — 80048 BASIC METABOLIC PNL TOTAL CA: CPT

## 2019-01-01 RX ORDER — HEPARIN SODIUM 5000 [USP'U]/ML
3200 INJECTION INTRAVENOUS; SUBCUTANEOUS EVERY 6 HOURS
Qty: 0 | Refills: 0 | Status: DISCONTINUED | OUTPATIENT
Start: 2019-01-01 | End: 2019-01-01

## 2019-01-01 RX ORDER — METOPROLOL TARTRATE 50 MG
0 TABLET ORAL
Qty: 0 | Refills: 0 | COMMUNITY

## 2019-01-01 RX ORDER — INSULIN GLARGINE 100 [IU]/ML
0 INJECTION, SOLUTION SUBCUTANEOUS
Qty: 0 | Refills: 0 | COMMUNITY

## 2019-01-01 RX ORDER — MAGNESIUM SULFATE 500 MG/ML
1 VIAL (ML) INJECTION ONCE
Qty: 0 | Refills: 0 | Status: COMPLETED | OUTPATIENT
Start: 2019-01-01 | End: 2019-01-01

## 2019-01-01 RX ORDER — METOPROLOL TARTRATE 50 MG
50 TABLET ORAL DAILY
Qty: 0 | Refills: 0 | Status: DISCONTINUED | OUTPATIENT
Start: 2019-01-01 | End: 2019-01-01

## 2019-01-01 RX ORDER — CEFUROXIME AXETIL 250 MG
250 TABLET ORAL EVERY 12 HOURS
Qty: 0 | Refills: 0 | Status: DISCONTINUED | OUTPATIENT
Start: 2019-01-01 | End: 2019-01-01

## 2019-01-01 RX ORDER — WARFARIN SODIUM 2.5 MG/1
4 TABLET ORAL ONCE
Qty: 0 | Refills: 0 | Status: DISCONTINUED | OUTPATIENT
Start: 2019-01-01 | End: 2019-01-01

## 2019-01-01 RX ORDER — MAGNESIUM SULFATE 500 MG/ML
2 VIAL (ML) INJECTION ONCE
Qty: 0 | Refills: 0 | Status: COMPLETED | OUTPATIENT
Start: 2019-01-01 | End: 2019-01-01

## 2019-01-01 RX ORDER — MAGNESIUM SULFATE 500 MG/ML
2 VIAL (ML) INJECTION ONCE
Qty: 0 | Refills: 0 | Status: DISCONTINUED | OUTPATIENT
Start: 2019-01-01 | End: 2019-01-01

## 2019-01-01 RX ORDER — WARFARIN SODIUM 2.5 MG/1
0 TABLET ORAL
Qty: 0 | Refills: 0 | COMMUNITY

## 2019-01-01 RX ORDER — GLUCAGON INJECTION, SOLUTION 0.5 MG/.1ML
1 INJECTION, SOLUTION SUBCUTANEOUS ONCE
Qty: 0 | Refills: 0 | Status: DISCONTINUED | OUTPATIENT
Start: 2019-01-01 | End: 2019-01-01

## 2019-01-01 RX ORDER — LOSARTAN POTASSIUM 100 MG/1
1 TABLET, FILM COATED ORAL
Qty: 0 | Refills: 0 | COMMUNITY

## 2019-01-01 RX ORDER — SIMVASTATIN 20 MG/1
40 TABLET, FILM COATED ORAL AT BEDTIME
Qty: 0 | Refills: 0 | Status: DISCONTINUED | OUTPATIENT
Start: 2019-01-01 | End: 2019-01-01

## 2019-01-01 RX ORDER — CEFUROXIME AXETIL 250 MG
500 TABLET ORAL ONCE
Qty: 0 | Refills: 0 | Status: COMPLETED | OUTPATIENT
Start: 2019-01-01 | End: 2019-01-01

## 2019-01-01 RX ORDER — WARFARIN SODIUM 2.5 MG/1
3 TABLET ORAL ONCE
Qty: 0 | Refills: 0 | Status: DISCONTINUED | OUTPATIENT
Start: 2019-01-01 | End: 2019-01-01

## 2019-01-01 RX ORDER — MAGNESIUM OXIDE 400 MG ORAL TABLET 241.3 MG
400 TABLET ORAL ONCE
Qty: 0 | Refills: 0 | Status: DISCONTINUED | OUTPATIENT
Start: 2019-01-01 | End: 2019-01-01

## 2019-01-01 RX ORDER — INSULIN GLARGINE 100 [IU]/ML
24 INJECTION, SOLUTION SUBCUTANEOUS
Qty: 0 | Refills: 0 | COMMUNITY

## 2019-01-01 RX ORDER — FUROSEMIDE 40 MG
1 TABLET ORAL
Qty: 0 | Refills: 0 | COMMUNITY

## 2019-01-01 RX ORDER — WARFARIN SODIUM 2.5 MG/1
3 TABLET ORAL ONCE
Qty: 0 | Refills: 0 | Status: COMPLETED | OUTPATIENT
Start: 2019-01-01 | End: 2019-01-01

## 2019-01-01 RX ORDER — WARFARIN SODIUM 2.5 MG/1
2.5 TABLET ORAL ONCE
Qty: 0 | Refills: 0 | Status: COMPLETED | OUTPATIENT
Start: 2019-01-01 | End: 2019-01-01

## 2019-01-01 RX ORDER — SIMVASTATIN 20 MG/1
0 TABLET, FILM COATED ORAL
Qty: 0 | Refills: 0 | COMMUNITY

## 2019-01-01 RX ORDER — FUROSEMIDE 40 MG
0 TABLET ORAL
Qty: 0 | Refills: 0 | COMMUNITY

## 2019-01-01 RX ORDER — LOSARTAN POTASSIUM 100 MG/1
0 TABLET, FILM COATED ORAL
Qty: 0 | Refills: 0 | COMMUNITY

## 2019-01-01 RX ORDER — INSULIN LISPRO 100/ML
VIAL (ML) SUBCUTANEOUS
Qty: 0 | Refills: 0 | Status: DISCONTINUED | OUTPATIENT
Start: 2019-01-01 | End: 2019-01-01

## 2019-01-01 RX ORDER — SITAGLIPTIN 50 MG/1
1 TABLET, FILM COATED ORAL
Qty: 0 | Refills: 0 | COMMUNITY

## 2019-01-01 RX ORDER — SIMVASTATIN 20 MG/1
1 TABLET, FILM COATED ORAL
Qty: 0 | Refills: 0 | COMMUNITY

## 2019-01-01 RX ORDER — HEPARIN SODIUM 5000 [USP'U]/ML
INJECTION INTRAVENOUS; SUBCUTANEOUS
Qty: 25000 | Refills: 0 | Status: DISCONTINUED | OUTPATIENT
Start: 2019-01-01 | End: 2019-01-01

## 2019-01-01 RX ORDER — MAGNESIUM SULFATE 500 MG/ML
4 VIAL (ML) INJECTION ONCE
Qty: 0 | Refills: 0 | Status: COMPLETED | OUTPATIENT
Start: 2019-01-01 | End: 2019-01-01

## 2019-01-01 RX ORDER — WARFARIN SODIUM 2.5 MG/1
1 TABLET ORAL
Qty: 0 | Refills: 0 | COMMUNITY

## 2019-01-01 RX ORDER — DEXTROSE 50 % IN WATER 50 %
25 SYRINGE (ML) INTRAVENOUS ONCE
Qty: 0 | Refills: 0 | Status: DISCONTINUED | OUTPATIENT
Start: 2019-01-01 | End: 2019-01-01

## 2019-01-01 RX ORDER — INSULIN GLARGINE 100 [IU]/ML
3 INJECTION, SOLUTION SUBCUTANEOUS AT BEDTIME
Qty: 0 | Refills: 0 | Status: DISCONTINUED | OUTPATIENT
Start: 2019-01-01 | End: 2019-01-01

## 2019-01-01 RX ORDER — SODIUM CHLORIDE 9 MG/ML
1000 INJECTION, SOLUTION INTRAVENOUS
Qty: 0 | Refills: 0 | Status: DISCONTINUED | OUTPATIENT
Start: 2019-01-01 | End: 2019-01-01

## 2019-01-01 RX ORDER — WARFARIN SODIUM 2.5 MG/1
3.5 TABLET ORAL ONCE
Qty: 0 | Refills: 0 | Status: DISCONTINUED | OUTPATIENT
Start: 2019-01-01 | End: 2019-01-01

## 2019-01-01 RX ORDER — DEXTROSE 50 % IN WATER 50 %
15 SYRINGE (ML) INTRAVENOUS ONCE
Qty: 0 | Refills: 0 | Status: DISCONTINUED | OUTPATIENT
Start: 2019-01-01 | End: 2019-01-01

## 2019-01-01 RX ORDER — WARFARIN SODIUM 2.5 MG/1
3.5 TABLET ORAL ONCE
Qty: 0 | Refills: 0 | Status: COMPLETED | OUTPATIENT
Start: 2019-01-01 | End: 2019-01-01

## 2019-01-01 RX ORDER — CEFUROXIME AXETIL 250 MG
1 TABLET ORAL
Qty: 20 | Refills: 0 | OUTPATIENT
Start: 2019-01-01 | End: 2019-01-01

## 2019-01-01 RX ORDER — METFORMIN HYDROCHLORIDE 850 MG/1
1 TABLET ORAL
Qty: 0 | Refills: 0 | COMMUNITY

## 2019-01-01 RX ORDER — SODIUM CHLORIDE 9 MG/ML
500 INJECTION INTRAMUSCULAR; INTRAVENOUS; SUBCUTANEOUS ONCE
Qty: 0 | Refills: 0 | Status: COMPLETED | OUTPATIENT
Start: 2019-01-01 | End: 2019-01-01

## 2019-01-01 RX ORDER — DEXTROSE 50 % IN WATER 50 %
20 SYRINGE (ML) INTRAVENOUS ONCE
Qty: 0 | Refills: 0 | Status: COMPLETED | OUTPATIENT
Start: 2019-01-01 | End: 2019-01-01

## 2019-01-01 RX ORDER — DEXTROSE 50 % IN WATER 50 %
12.5 SYRINGE (ML) INTRAVENOUS ONCE
Qty: 0 | Refills: 0 | Status: DISCONTINUED | OUTPATIENT
Start: 2019-01-01 | End: 2019-01-01

## 2019-01-01 RX ORDER — HEPARIN SODIUM 5000 [USP'U]/ML
750 INJECTION INTRAVENOUS; SUBCUTANEOUS
Qty: 25000 | Refills: 0 | Status: DISCONTINUED | OUTPATIENT
Start: 2019-01-01 | End: 2019-01-01

## 2019-01-01 RX ORDER — WARFARIN SODIUM 2.5 MG/1
5 TABLET ORAL ONCE
Qty: 0 | Refills: 0 | Status: COMPLETED | OUTPATIENT
Start: 2019-01-01 | End: 2019-01-01

## 2019-01-01 RX ORDER — HEPARIN SODIUM 5000 [USP'U]/ML
3200 INJECTION INTRAVENOUS; SUBCUTANEOUS ONCE
Qty: 0 | Refills: 0 | Status: COMPLETED | OUTPATIENT
Start: 2019-01-01 | End: 2019-01-01

## 2019-01-01 RX ORDER — METOPROLOL TARTRATE 50 MG
1 TABLET ORAL
Qty: 0 | Refills: 0 | COMMUNITY

## 2019-01-01 RX ORDER — CEFTRIAXONE 500 MG/1
1 INJECTION, POWDER, FOR SOLUTION INTRAMUSCULAR; INTRAVENOUS ONCE
Qty: 0 | Refills: 0 | Status: COMPLETED | OUTPATIENT
Start: 2019-01-01 | End: 2019-01-01

## 2019-01-01 RX ADMIN — Medication 50 MILLIGRAM(S): at 05:46

## 2019-01-01 RX ADMIN — Medication 50 MILLIGRAM(S): at 09:33

## 2019-01-01 RX ADMIN — Medication 50 MILLIGRAM(S): at 06:35

## 2019-01-01 RX ADMIN — INSULIN GLARGINE 3 UNIT(S): 100 INJECTION, SOLUTION SUBCUTANEOUS at 23:48

## 2019-01-01 RX ADMIN — Medication 500 MILLIGRAM(S): at 18:49

## 2019-01-01 RX ADMIN — CEFTRIAXONE 100 GRAM(S): 500 INJECTION, POWDER, FOR SOLUTION INTRAMUSCULAR; INTRAVENOUS at 02:45

## 2019-01-01 RX ADMIN — Medication 100 GRAM(S): at 09:48

## 2019-01-01 RX ADMIN — SIMVASTATIN 40 MILLIGRAM(S): 20 TABLET, FILM COATED ORAL at 22:20

## 2019-01-01 RX ADMIN — Medication 1: at 08:54

## 2019-01-01 RX ADMIN — Medication 2: at 12:52

## 2019-01-01 RX ADMIN — HEPARIN SODIUM 750 UNIT(S)/HR: 5000 INJECTION INTRAVENOUS; SUBCUTANEOUS at 07:42

## 2019-01-01 RX ADMIN — HEPARIN SODIUM 3200 UNIT(S): 5000 INJECTION INTRAVENOUS; SUBCUTANEOUS at 06:30

## 2019-01-01 RX ADMIN — INSULIN GLARGINE 3 UNIT(S): 100 INJECTION, SOLUTION SUBCUTANEOUS at 22:11

## 2019-01-01 RX ADMIN — SIMVASTATIN 40 MILLIGRAM(S): 20 TABLET, FILM COATED ORAL at 23:44

## 2019-01-01 RX ADMIN — Medication 3: at 22:18

## 2019-01-01 RX ADMIN — Medication 1: at 12:43

## 2019-01-01 RX ADMIN — Medication 20 MILLILITER(S): at 01:39

## 2019-01-01 RX ADMIN — Medication 3: at 18:21

## 2019-01-01 RX ADMIN — SIMVASTATIN 40 MILLIGRAM(S): 20 TABLET, FILM COATED ORAL at 22:07

## 2019-01-01 RX ADMIN — Medication 3: at 22:48

## 2019-01-01 RX ADMIN — HEPARIN SODIUM 650 UNIT(S)/HR: 5000 INJECTION INTRAVENOUS; SUBCUTANEOUS at 06:30

## 2019-01-01 RX ADMIN — Medication 4: at 12:35

## 2019-01-01 RX ADMIN — Medication 250 MILLIGRAM(S): at 01:07

## 2019-01-01 RX ADMIN — SODIUM CHLORIDE 500 MILLILITER(S): 9 INJECTION INTRAMUSCULAR; INTRAVENOUS; SUBCUTANEOUS at 16:30

## 2019-01-01 RX ADMIN — Medication 250 MILLIGRAM(S): at 12:18

## 2019-01-01 RX ADMIN — Medication 3: at 23:47

## 2019-01-01 RX ADMIN — SODIUM CHLORIDE 60 MILLILITER(S): 9 INJECTION, SOLUTION INTRAVENOUS at 17:50

## 2019-01-01 RX ADMIN — INSULIN GLARGINE 3 UNIT(S): 100 INJECTION, SOLUTION SUBCUTANEOUS at 22:18

## 2019-01-01 RX ADMIN — WARFARIN SODIUM 3 MILLIGRAM(S): 2.5 TABLET ORAL at 22:07

## 2019-01-01 RX ADMIN — Medication 2: at 17:58

## 2019-01-01 RX ADMIN — Medication 3: at 12:28

## 2019-01-01 RX ADMIN — SODIUM CHLORIDE 100 MILLILITER(S): 9 INJECTION, SOLUTION INTRAVENOUS at 13:26

## 2019-01-01 RX ADMIN — SODIUM CHLORIDE 500 MILLILITER(S): 9 INJECTION INTRAMUSCULAR; INTRAVENOUS; SUBCUTANEOUS at 18:19

## 2019-01-01 RX ADMIN — WARFARIN SODIUM 2.5 MILLIGRAM(S): 2.5 TABLET ORAL at 21:53

## 2019-01-01 RX ADMIN — Medication 3: at 18:08

## 2019-01-01 RX ADMIN — Medication 250 MILLIGRAM(S): at 04:47

## 2019-01-01 RX ADMIN — HEPARIN SODIUM 750 UNIT(S)/HR: 5000 INJECTION INTRAVENOUS; SUBCUTANEOUS at 12:19

## 2019-01-01 RX ADMIN — Medication 100 GRAM(S): at 17:47

## 2019-01-01 RX ADMIN — Medication 250 MILLIGRAM(S): at 12:45

## 2019-01-01 RX ADMIN — WARFARIN SODIUM 3.5 MILLIGRAM(S): 2.5 TABLET ORAL at 23:44

## 2019-01-01 RX ADMIN — SODIUM CHLORIDE 100 MILLILITER(S): 9 INJECTION, SOLUTION INTRAVENOUS at 01:38

## 2019-01-01 RX ADMIN — WARFARIN SODIUM 3 MILLIGRAM(S): 2.5 TABLET ORAL at 22:20

## 2019-01-01 RX ADMIN — Medication 50 MILLIGRAM(S): at 06:06

## 2019-01-01 RX ADMIN — HEPARIN SODIUM 750 UNIT(S)/HR: 5000 INJECTION INTRAVENOUS; SUBCUTANEOUS at 15:26

## 2019-01-01 RX ADMIN — Medication 50 GRAM(S): at 09:05

## 2019-01-01 RX ADMIN — Medication 250 MILLIGRAM(S): at 15:26

## 2019-01-01 RX ADMIN — Medication 2: at 17:44

## 2019-01-01 RX ADMIN — Medication 250 MILLIGRAM(S): at 03:20

## 2019-01-01 RX ADMIN — Medication 50 MILLIGRAM(S): at 07:42

## 2019-01-01 RX ADMIN — Medication 100 GRAM(S): at 16:29

## 2019-01-01 RX ADMIN — SIMVASTATIN 40 MILLIGRAM(S): 20 TABLET, FILM COATED ORAL at 21:53

## 2019-01-01 RX ADMIN — HEPARIN SODIUM 750 UNIT(S)/HR: 5000 INJECTION INTRAVENOUS; SUBCUTANEOUS at 21:52

## 2019-01-01 RX ADMIN — Medication 1: at 08:59

## 2019-01-01 RX ADMIN — Medication 1: at 22:12

## 2019-01-01 RX ADMIN — WARFARIN SODIUM 5 MILLIGRAM(S): 2.5 TABLET ORAL at 21:54

## 2019-01-01 RX ADMIN — SIMVASTATIN 40 MILLIGRAM(S): 20 TABLET, FILM COATED ORAL at 21:54

## 2019-01-15 PROBLEM — I25.10 ATHEROSCLEROTIC HEART DISEASE OF NATIVE CORONARY ARTERY WITHOUT ANGINA PECTORIS: Chronic | Status: ACTIVE | Noted: 2018-05-30

## 2019-01-15 PROBLEM — F32.9 MAJOR DEPRESSIVE DISORDER, SINGLE EPISODE, UNSPECIFIED: Chronic | Status: ACTIVE | Noted: 2018-05-30

## 2019-01-15 PROBLEM — I10 ESSENTIAL (PRIMARY) HYPERTENSION: Chronic | Status: ACTIVE | Noted: 2018-05-30

## 2019-01-15 PROBLEM — E11.9 TYPE 2 DIABETES MELLITUS WITHOUT COMPLICATIONS: Chronic | Status: ACTIVE | Noted: 2018-05-30

## 2019-01-15 NOTE — ED ADULT NURSE NOTE - CHIEF COMPLAINT QUOTE
elderly pt bibems accompanied by HHA from home for lethargy; bg on scene read "low" given orange juice and d50, pt arrives at baseline mental status, repeat bg 118.

## 2019-01-15 NOTE — ED PROVIDER NOTE - PROGRESS NOTE DETAILS
Patient eating a [employer] sandwich, feeling much better. Direct Bilirubin 0.3. No abdo pain. Sono cancelled. Will d/c. Patient to fu with PMD this week. May have had mild AGE. UA quite dark after IVF, getting second 500 cc NS. UA consistent with UTI. Will tx with Ceftin. Patient dn have a PCN allergy per them.

## 2019-01-15 NOTE — ED ADULT TRIAGE NOTE - CHIEF COMPLAINT QUOTE
elderly pt bibems from home for lethargy; bg on scene read "low" given orange juice and d50, pt arrives at baseline mental status. elderly pt bibems accompanied by HHA from home for lethargy; bg on scene read "low" given orange juice and d50, pt arrives at baseline mental status, repeat bg 118.

## 2019-01-15 NOTE — ED PROVIDER NOTE - PHYSICAL EXAMINATION
VITAL SIGNS: I have reviewed nursing notes and confirm.  CONSTITUTIONAL: Well-developed; well-nourished; in no acute distress.  SKIN: Skin is warm and dry, no acute rash.  HEAD: Normocephalic; atraumatic.  EYES: Pupils round bilaterally, 3mm; conjunctiva and sclera clear.  ENT: No nasal discharge; airway clear, MM dry.  NECK: Supple; non tender.  CARD: S1, S2 normal; no murmurs, gallops, or rubs. Regular rate and rhythm.  RESP: No wheezes, rales or rhonchi.  ABD: Soft; non-distended; non-tender; rebound or guarding.  EXT: Normal ROM. No clubbing, cyanosis or edema.  NEURO: Alert, oriented. Grossly unremarkable. MORENO, normal tone, no gross motor or sensory changes. Fluent speech.   PSYCH: Cooperative, appropriate. Mood and affect wnl.

## 2019-01-15 NOTE — ED ADULT NURSE NOTE - NSIMPLEMENTINTERV_GEN_ALL_ED
Implemented All Universal Safety Interventions:  [place] to call system. Call bell, personal items and telephone within reach. Instruct patient to call for assistance. Room bathroom lighting operational. Non-slip footwear when patient is off stretcher. Physically safe environment: no spills, clutter or unnecessary equipment. Stretcher in lowest position, wheels locked, appropriate side rails in place.

## 2019-01-15 NOTE — ED PROVIDER NOTE - OBJECTIVE STATEMENT
78 F BIB by family and EMS for AMS and hypoglycemia. Given D50 in the field and is now at baseline. This hasn't happened before. Per son she has been more tired over th past few days. Eating less. Last night just had a fruit cup and a toast for dinner and took usual dose of Lantus. In the am took DM meds (Metformin and Januvia) and had potatoes and cheese. No f/c, no pain, no shortness of breath, no n/v/d. 78 F BIB by family and EMS for AMS and hypoglycemia. Given D50 in the field and is now at baseline. This hasn't happened before. Per son she has been more tired over th past few days, had diarrhea yesterday. Eating less. Last night just had a fruit cup and a toast for dinner and took usual dose of Lantus. In the am took DM meds (Metformin and Januvia) and had potatoes and cheese. No f/c, no pain, no shortness of breath, no n/v.

## 2019-01-15 NOTE — ED PROVIDER NOTE - CARE PLAN
Principal Discharge DX:	Hypoglycemia Principal Discharge DX:	Hypoglycemia  Secondary Diagnosis:	Acute cystitis without hematuria  Secondary Diagnosis:	Dehydration

## 2019-01-15 NOTE — ED PROVIDER NOTE - MEDICAL DECISION MAKING DETAILS
Patient presenting with hypoglycemic episode in the setting of being more tired and dec PO x 3d. Will check labs and reassess. No focal sx now.

## 2019-01-15 NOTE — ED ADULT NURSE NOTE - OBJECTIVE STATEMENT
pta, blood glucose "lo", per son pt with decreased appetite, dextrose given pta, symptoms now resolved, family at sign, no s/s of distress

## 2019-01-15 NOTE — ED PROVIDER NOTE - NSFOLLOWUPINSTRUCTIONS_ED_ALL_ED_FT
Please stay hydrated.  Return to the ER for fever, low blood sugar, pain in the flanks, pain with urination or any other medical emergency.

## 2019-01-18 NOTE — ED ADULT NURSE NOTE - NSIMPLEMENTINTERV_GEN_ALL_ED
Implemented All Fall with Harm Risk Interventions:  [place] to call system. Call bell, personal items and telephone within reach. Instruct patient to call for assistance. Room bathroom lighting operational. Non-slip footwear when patient is off stretcher. Physically safe environment: no spills, clutter or unnecessary equipment. Stretcher in lowest position, wheels locked, appropriate side rails in place. Provide visual cue, wrist band, yellow gown, etc. Monitor gait and stability. Monitor for mental status changes and reorient to person, place, and time. Review medications for side effects contributing to fall risk. Reinforce activity limits and safety measures with patient and family. Provide visual clues: red socks.

## 2019-01-18 NOTE — H&P ADULT - PROBLEM SELECTOR PLAN 5
- Normotensive on admission  - Takes Toprol 50 at home, will continue 2/2 aflutter as well  - Will monitor BPs  - Hold Losartan in setting of normotensive and recent KURT    #HLD  - Continue simvastatin 40 mg - Plan as above for hypoglycemia    #UTI  - Received CTX earlier  - Can continue home cefuroxime, total 7 day course max

## 2019-01-18 NOTE — ED PROVIDER NOTE - SKIN, MLM
Skin normal color for race, warm, dry and intact. Skin normal color for race, warm, dry and intact. +Mid sternal scar, healed well.

## 2019-01-18 NOTE — H&P ADULT - PROBLEM SELECTOR PLAN 7
- Patient with AOX1-2 at baseline, currently oriented to name location and month  - Intermittent agitation, though currently calm  - SW/CM consult - Hx of coumadin use 2.5 mg  - Current INR 1.68  - On heparin drip, PTTs Q6 as patient is bridged to coumadin  - Goal INR 2.5-3.5

## 2019-01-18 NOTE — PATIENT PROFILE ADULT - INTERPRETATION SERVICES DECLINED
pt is A/Ox1, demented at baseline. Family member at bedside main source of information. Patient/Caregiver requests family/friend to interpret./pt is A/Ox1, demented at baseline. Family member at bedside main source of information.

## 2019-01-18 NOTE — H&P ADULT - PROBLEM SELECTOR PLAN 1
- Hx of DM2, for which she uses Lantus 24U, Januvia 25, metformin 1000 BID  - No recent change to antihyperglycemic regimen; however, in context of current illness, patient may have received too high of a dose resulting in symptomatic hypoglycemia  - Will hold oral antihyperglycemics and ALL insulin  - FS Q4 to monitor glucose - goal to monitor off all insulin to determine if patient with iatogrenic vs. organic reason for hypoglycemia  - TSH WNL, will get cortisol level, check sulfonylurea level although patient and family denies - Hx of DM2, for which she uses Lantus 24U, Januvia 25, metformin 1000 BID  - No recent change to antihyperglycemic regimen; however, in context of current illness, patient may have received too high of a dose resulting in symptomatic hypoglycemia  - Will hold oral antihyperglycemics and ALL insulin  - FS Q4 to monitor glucose - goal to monitor off all insulin to determine if patient with iatogrenic vs. organic reason for hypoglycemia  - TSH WNL, will get cortisol level, check sulfonylurea level although patient and family denies; proinsulin level, CPeptide level

## 2019-01-18 NOTE — H&P ADULT - PROBLEM SELECTOR PLAN 4
- Plan as above for hypoglycemia - Plan as above for hypoglycemia    #UTI  - Received CTX earlier  - Can continue home cefuroxime, total 7 day course max - Lactate persistently increased  - May be increased in setting of metformin use with KURT vs. hypoglycemic episode  - SIRS 0/4, unlikely sepsis

## 2019-01-18 NOTE — ED PROVIDER NOTE - MEDICAL DECISION MAKING DETAILS
79 y/o female with PMHx of DM and HTN and BIB EMS presents to the ED with complaints of hypoglycemia, weakness, and drooling. She states she took 1000mg of Metformin, and then was given insulin by a family member. Blood sugar on seen 29. EMS gave her 12g of oral glucose and now FS is 240. Pt was seen 3 days ago in current ED for similar complaints. No fever, no chills, no N/V.  Will place in CDU and monitor blood glucose.  Will give D5 0.45% saline at 100mL/hr.

## 2019-01-18 NOTE — H&P ADULT - ASSESSMENT
79 yo F DM, HTN, senile dementia, Mitral valve replacement, presenting for symptomatic hypoglycemia.

## 2019-01-18 NOTE — H&P ADULT - PMH
CAD (coronary artery disease)    Dementia without behavioral disturbance, unspecified dementia type    Depression    DM (diabetes mellitus)    HTN (hypertension)    Hyperlipidemia, unspecified hyperlipidemia type

## 2019-01-18 NOTE — H&P ADULT - PROBLEM SELECTOR PLAN 9
F: D51/2NS 100 cc/hr  E: Replete goal K>4, Mg>2  N: Consistent carb and DASH diet F: D51/2NS 100 cc/hr, cont. lung checks  E: Replete goal K>4, Mg>2  N: Consistent carb and DASH diet F: D51/2NS 100 cc/hr, cont. lung checks, if patient eating, can d/c fluids  E: Replete goal K>4, Mg>2  N: Consistent carb and DASH diet F: D51/2NS 100 cc/hr, cont. lung checks, if patient eating, can d/c fluids  E: Replete goal K>4, Mg>2  N: Consistent carb and DASH diet    #Prophylactic measure  DDVT PPx: On heparin drip and bridging to coumadin  No GI PPx  FULL CODE currently, son/HCP will discuss code status further  Dispo: [facility]; will have to await therapeutic INR prior to dispo

## 2019-01-18 NOTE — H&P ADULT - PROBLEM SELECTOR PROBLEM 5
Essential hypertension Type 2 diabetes mellitus with diabetic dermatitis, with long-term current use of insulin

## 2019-01-18 NOTE — H&P ADULT - PROBLEM SELECTOR PLAN 3
- AFlutter noted on EKG  - No known hx of specific arrythmia  - Patient on metop. succinate 50 mg at home  - Monitor EKGs, continue Toprol XL

## 2019-01-18 NOTE — H&P ADULT - NSHPLABSRESULTS_GEN_ALL_CORE
10.6   9.2   )-----------( 283      ( 18 Jan 2019 02:46 )             33.2   01-18    136  |  99  |  41<H>  ----------------------------<  22<LL>  4.8   |  24  |  1.47<H>    Ca    9.3      18 Jan 2019 02:46    TPro  7.5  /  Alb  3.3<L>  /  TBili  1.2  /  DBili  x   /  AST  50<H>  /  ALT  20  /  AlkPhos  66  01-18

## 2019-01-18 NOTE — ED CDU PROVIDER INITIAL DAY NOTE - OBJECTIVE STATEMENT
79 y/o female with PMHx of DM and HTN and BIB EMS presents to the ED with complaints of hypoglycemia, weakness, and drooling. She states she took 1000mg of Metformin, and then was given insulin by a family member. Blood sugar on seen 29. EMS gave her 12g of oral glucose and now FS is 240. Pt was seen 3 days ago in current ED for similar complaints. No fever, no chills, no N/V.

## 2019-01-18 NOTE — ED ADULT TRIAGE NOTE - CHIEF COMPLAINT QUOTE
pt biba from home for c/o weakness. blood sugar on seen 29. ems gave 12g of oral glucose and now FS is 240. pt was seen the other day in this ED.

## 2019-01-18 NOTE — H&P ADULT - PROBLEM SELECTOR PLAN 6
- Hx of coumadin use 2.5 mg  - Current INR 1.68  - On heparin drip, PTTs Q6 as patient is bridged  - Goal INR 2.5-3.5 - Hx of coumadin use 2.5 mg  - Current INR 1.68  - On heparin drip, PTTs Q6 as patient is bridged to coumadin  - Goal INR 2.5-3.5 - Normotensive on admission  - Takes Toprol 50 at home, will continue 2/2 aflutter as well  - Will monitor BPs  - Hold Losartan in setting of normotensive and recent KURT    #HLD  - Continue simvastatin 40 mg

## 2019-01-18 NOTE — H&P ADULT - ATTENDING COMMENTS
Pt. seen and examined by me earlier today; I have read Dr. Mcdonough's H&P, I agree w/ his findings and plan of care as documented; hold Lantus, metformin, and Januvia for now, monitor FS/BG, cover w/ CHRISTI; hypoglycemia work-up pending; cont. heparin gtt bridge + Coumadin, goal INR 2.5-3.5; lactic acidosis resolved, was likely d/t hypoglycemia

## 2019-01-18 NOTE — ED PROVIDER NOTE - OBJECTIVE STATEMENT
79 y/o female with PMHx of DM presents to the ED with complaints of hypoglycemia and weakness. She states she took 1000mg of Metformin, and then was given insulin by a family member. EMS gave her 12g of oral glucose and now FS is 240. Pt was seen 3 days ago in current ED for similar complaints. No fever, no chills, no N/V. 77 y/o female with PMHx of DM and HTN and BIB EMS presents to the ED with complaints of hypoglycemia, weakness, and drooling. She states she took 1000mg of Metformin, and then was given insulin by a family member. EMS gave her 12g of oral glucose and now FS is 240. Pt was seen 3 days ago in current ED for similar complaints. No fever, no chills, no N/V. 79 y/o female with PMHx of DM and HTN and BIB EMS presents to the ED with complaints of hypoglycemia, weakness, and drooling. She states she took 1000mg of Metformin, and then was given insulin by a family member. Blood sugar on seen 29. EMS gave her 12g of oral glucose and now FS is 240. Pt was seen 3 days ago in current ED for similar complaints. No fever, no chills, no N/V.

## 2019-01-18 NOTE — H&P ADULT - HISTORY OF PRESENT ILLNESS
79 yo F DM, HTN, senile dementia, Mitral valve replacement, presenting for hypoglycemic episode.  She was last in her usual state of health one week ago when she started developing weakness, felt lightheaded, and became more pale.  Family realized something was wrong.  Her urine also smelled more than usual and she has had increased loose stools during this time period.  She was originally brought to [facility] in 1/16 due to similar symptoms and she was found to have a UTI (treated with CTX and then cefuroxamine) and hypoglycemic for which she was given D50 and glucose levels recovered.  Per son at bedside, she had eaten less than usual at the time but still received her same medication regimen (Januvia 25, Lantus 24, metformin 1000 BID).  She was sent home from the ER that day.  However, one day PTA, she was brought to [facility] for similar symptoms.  Her glucose was 29 on arrival.  She was given Dextrose as well and her glucose responded to 54 and then 166.  Currently, denies nausea or vomiting, still with loose stools, has had weight loss over the past month.  She has intermittent chest pain and SOB with exertion, although these are chronic symptoms.  She is AOX1 at baseline and gets an aide at home, 7 hrs for 7 days.  Able to minimally complete ADLs.      ED Course:  /70 HR 73 T 98.1, 98% room air.  Glucose 29 when EMS arrived, received 12 g glucose, FS rebounded to 240.  WBC 9.2 Hb 10.6, .  Na 136 K 4.8 Cl 99 BUN 41, Cr 1.47 (unsure of baseline), lipase 176, TSH 3.673, troponin WNL.  EKG with Atrial flutter pattern.  CXR with cardiomegaly. 79 yo F DM, HTN, senile dementia, Mitral valve replacement, presenting for hypoglycemic episode.  She was last in her usual state of health one week ago when she started developing weakness, felt lightheaded, and became more pale.  Family realized something was wrong.  Her urine also smelled more than usual and she has had increased loose stools during this time period.  She was originally brought to [facility] in 1/16 due to similar symptoms and she was found to have a UTI (treated with CTX and then cefuroxamine) and hypoglycemic for which she was given D50 and glucose levels recovered.  Per son at bedside, she had eaten less than usual at the time but still received her same medication regimen (Januvia 25, Lantus 24, metformin 1000 BID).  She was sent home from the ER that day.  However, one day PTA, she was brought to [facility] for similar symptoms.  Her glucose was 29 on arrival.  She was given Dextrose as well and her glucose responded to 54 and then 166.  Currently, denies nausea or vomiting, still with loose stools, has had weight loss over the past month.  She has intermittent chest pain and SOB with exertion, although these are chronic symptoms.  She is AOX1 at baseline and gets an aide at home, 7 hrs for 7 days.  Able to minimally complete ADLs.      Collateral received from [facility] ([phone]).  Recent HbA1c was 6.6.  Baseline Cr around 1.0, but had recent KURT in 9/2018 for which she was told to hold metformin and stopped glipizide and pioglitizone.  In 10/2018, she was started on her current diabetic regimen.    Of note, she had weighed 170 lbs dry weight in the past, which was up to 200 lbs 2/2 fluid overload.  Placed on diuretics and went back to 170.  Has not had any recent mammograms or colonoscopy.  CTAP demonstrated only pleural effusions.    ED Course:  /70 HR 73 T 98.1, 98% room air.  Glucose 29 when EMS arrived, received 12 g glucose, FS rebounded to 240.  WBC 9.2 Hb 10.6, .  Na 136 K 4.8 Cl 99 BUN 41, Cr 1.47 (unsure of baseline), lipase 176, TSH 3.673, troponin WNL.  EKG with Atrial flutter pattern.  CXR with cardiomegaly.

## 2019-01-18 NOTE — H&P ADULT - PROBLEM SELECTOR PLAN 8
DDVT PPx: On heparin drip  No GI PPx  FULL CODE currently, son/HCP will discusss code status further  Dispo: [facility] DDVT PPx: On heparin drip  No GI PPx  FULL CODE currently, son/HCP will discusss code status further  Dispo: [facility]; will have to await therapeutic INR prior to dispo DDVT PPx: On heparin drip and bridging to coumadin  No GI PPx  FULL CODE currently, son/HCP will discuss code status further  Dispo: [facility]; will have to await therapeutic INR prior to dispo - Patient with AOX1-2 at baseline, currently oriented to name location and month  - Intermittent agitation, though currently calm  - SW/CM consult

## 2019-01-18 NOTE — ED PROVIDER NOTE - PSYCHIATRIC, MLM
Alert and oriented to person, place, time/situation. normal mood and affect. Alert and oriented to person, place, time/situation. Confused.

## 2019-01-18 NOTE — ED ADULT NURSE NOTE - OBJECTIVE STATEMENT
metformin 100 and blood pressure med 2200. 2315 inuslin regular 24 units. Pt is a 78y female complaining of hypoglycemia. Pt AAOx3 to self only. As per grandson pt has history of dementia and this is her baseline.  As per grandson pt received  metformin 100 and blood pressure med 2200 a few minutes later he found her coughing and he was unable to arouse her when he was able to arouse her she has slurred speech and was weak but was able to drink water and stated that she was feeling better. At  2315 inuslin regular 24 units was administered. Pt was given 2g of oral glucose by EMS. Pts FS here was 54. Pt was given 1 amp of D5 and  is receiving D5 1/2 NS at 100ml/hr. Pt denies chest pain, sob, nausea, vomiting, fever and chills. Will continue to monitor. Pt is a 78y female complaining of hypoglycemia. Pt AAOx3 to self only. As per grandson pt has history of dementia and this is her baseline.  As per grandson pt received  metformin 100 and blood pressure med 2200 a few minutes later he found her coughing and he was unable to arouse her when he was able to arouse her she has slurred speech and was weak but was able to drink water and stated that she was feeling better. At  2315 inlin regular 24 units was administered and vomited afterwards, which prompted the call to EMS. Pt was given 2g of oral glucose by EMS. Pts FS here was 54. Pt was given 1 amp of D5 and  is receiving D5 1/2 NS at 100ml/hr. Pt denies chest pain, sob, nausea, vomiting, fever and chills. Will continue to monitor.

## 2019-01-18 NOTE — ED CDU PROVIDER DISPOSITION NOTE - CLINICAL COURSE
Patient with blood glucose 22 on presentation, improved with IV dextrose, aflutter on EKG, UTI given ceftriaxone in ED, initially admitted to bed at [place] where her doctor goes but there are no beds at [place] so admitted to Dr Thorne though Dr Persaud and d/w Dr Jeramie mars at [place].  Recommended heparin gtt for flutter.

## 2019-01-18 NOTE — ED PROVIDER NOTE - CARE PLAN
Principal Discharge DX:	Hypoglycemia  Secondary Diagnosis:	Metformin adverse reaction, initial encounter

## 2019-01-18 NOTE — H&P ADULT - PROBLEM SELECTOR PLAN 2
- Unclear if KURT vs. CKD  - Reached out to PCP for further collateral; awaiting call back  - Renal dysfunction is part of recent hx, metformin toxicity may explain elevated lactate level as well  - Collect urine lytes to calculate FeUrea  - Hold lasix in setting of UTI and current KURT  - Hold losartan in setting of KURT  - Hold metformin  - Repeat BMP and lactate  - Monitor urine output    #Hematuria  - Pt reports recent weight loss  - Recent U/A with hematuria, will trend Urinalyses and d/w PMD about recent cancer screening - Unclear if KURT vs. CKD  - Reached out to PCP for further collateral; awaiting call back  - Renal dysfunction is part of recent hx, metformin toxicity may explain elevated lactate level as well  - Collect urine lytes to calculate FeUrea  - Hold lasix in setting of UTI and current KURT  - Hold losartan in setting of KURT  - Hold metformin  - Repeat BMP and lactate  - Monitor urine output  - Cont. IVF, lung checks    #Hematuria  - Pt reports recent weight loss  - Recent U/A with hematuria, will trend Urinalyses and d/w PMD about recent cancer screening - Likely KURT  - Reached out to PCP for further collateral  - Renal dysfunction is part of recent hx, metformin toxicity may explain elevated lactate level as well  - Collect urine lytes to calculate FeUrea  - Hold lasix in setting of UTI and current KURT  - Hold losartan in setting of KURT  - Hold metformin  - Repeat BMP and lactate  - Monitor urine output  - Cont. IVF, lung checks    #Hematuria  - Pt reports recent weight loss  - Recent U/A with hematuria, will trend Urinalyses and d/w PMD about recent cancer screening  - Recent CTAP negative

## 2019-01-18 NOTE — H&P ADULT - PROBLEM SELECTOR PROBLEM 4
Type 2 diabetes mellitus with diabetic dermatitis, with long-term current use of insulin Lactate blood increase

## 2019-01-18 NOTE — ED CDU PROVIDER DISPOSITION NOTE - SECONDARY DIAGNOSIS.
Metformin adverse reaction, initial encounter HTN (hypertension) Atrial flutter by electrocardiogram DM (diabetes mellitus) Heart valve replaced

## 2019-01-18 NOTE — H&P ADULT - PROBLEM SELECTOR PLAN 10
1.       PCP Contacted on Admission: (Y/N) --> Name & Phone #: (YCharity Joshua Hamida  2.       Date of Contact with PCP: 1/18/19  3.       PCP Contacted at Discharge: (Y/N)  4.       Summary of Handoff Given to PCP:   5.       Post-Discharge Appointment Date and Location:

## 2019-01-18 NOTE — H&P ADULT - NSHPPHYSICALEXAM_GEN_ALL_CORE
.  VITAL SIGNS:  T(C): 36.5 (01-18-19 @ 07:38), Max: 36.8 (01-18-19 @ 05:58)  T(F): 97.7 (01-18-19 @ 07:38), Max: 98.3 (01-18-19 @ 05:58)  HR: 73 (01-18-19 @ 07:38) (73 - 76)  BP: 110/70 (01-18-19 @ 07:38) (106/70 - 112/68)  BP(mean): --  RR: 17 (01-18-19 @ 07:38) (17 - 18)  SpO2: 99% (01-18-19 @ 07:38) (98% - 99%)  Wt(kg): --    PHYSICAL EXAM:    Constitutional: WDWN resting comfortably in bed; NAD  Head: NC/AT  Eyes: PERRL, EOMI, anicteric sclera  ENT: no nasal discharge; uvula midline, no oropharyngeal erythema or exudates; MMM  Neck: supple; no JVD or thyromegaly  Respiratory: CTA B/L; no W/R/R, no retractions  Cardiac: +S1/S2; RRR; holosystolic murmur 3/6 heard loudest at LLSB; PMI non-displaced  Gastrointestinal: abdomen soft, NT/ND; no rebound or guarding; +BSx4; no organomegaly noted  Back: spine midline, no bony tenderness or step-offs; no CVAT B/L  Extremities: WWP, no clubbing or cyanosis; no peripheral edema; chronic skin changes to LEs  Musculoskeletal: NROM x4; no joint swelling, tenderness or erythema  Vascular: 2+ radial, DP/PT pulses B/L  Dermatologic: skin warm, dry and intact; no rashes, wounds, or scars  Lymphatic: no submandibular or cervical LAD  Neurologic: AAOx1; CNII-XII grossly intact; no focal deficits  Psychiatric: No agitation; affect and characteristics of appearance, verbalizations, behaviors are appropriate

## 2019-01-18 NOTE — ED CDU PROVIDER INITIAL DAY NOTE - MEDICAL DECISION MAKING DETAILS
77 y/o female with PMHx of DM and HTN and BIB EMS presents to the ED with complaints of hypoglycemia, weakness, and drooling. She states she took 1000mg of Metformin, and then was given insulin by a family member. Blood sugar on seen 29. EMS gave her 12g of oral glucose and now FS is 240. Pt was seen 3 days ago in current ED for similar complaints. No fever, no chills, no N/V.  Will place in CDU and monitor blood glucose.  Will give D5 0.45% saline at 100mL/hr.

## 2019-01-18 NOTE — H&P ADULT - NSHPREVIEWOFSYSTEMS_GEN_ALL_CORE
REVIEW OF SYSTEMS      General:	Denies fevers, chills, +generalized malaise    Skin/Breast: Denies rash  	  Ophthalmologic: Denies changes in viosn  	  ENMT: Denies throat pain	    Respiratory and Thorax: SOB with exertion  	  Cardiovascular: Chest Pain with exertion	    Gastrointestinal:	+ diarrhea    Genitourinary: Per HPI	    Musculoskeletal:	Generalized MSK weakness    Neurological: See HPI	    Psychiatric: intermittent agitation at night	    Endocrine: See HPI REVIEW OF SYSTEMS      General:	Denies fevers, chills, +generalized malaise    Skin/Breast: Denies rash  	  Ophthalmologic: Denies changes in vision  	  ENMT: Denies throat pain	    Respiratory and Thorax: SOB with exertion  	  Cardiovascular: Chest Pain with exertion	    Gastrointestinal:	+ diarrhea    Genitourinary: Per HPI	    Musculoskeletal:	Generalized MSK weakness    Neurological: See HPI	    Psychiatric: intermittent agitation at night	    Endocrine: See HPI

## 2019-01-19 NOTE — PHYSICAL THERAPY INITIAL EVALUATION ADULT - PERTINENT HX OF CURRENT PROBLEM, REHAB EVAL
Pt. is a  78 y.o. female admitted with AMS, weakness, lightheadedness. Pt. was found to be hypoglycemic and + for UTI.

## 2019-01-19 NOTE — PROGRESS NOTE ADULT - PROBLEM SELECTOR PLAN 3
Pt pw positive UA from prev ED visit on 1/15/19 prior to adm on 1/18/19. +UA (large leukocyte esterase, hematuria, many WBCs, many bacteria detected, neg nitrite). Pt pw positive UA from prev ED visit on 1/15/19 prior to adm on 1/18/19. +UA (large leukocyte esterase, hematuria, many WBCs, many bacteria detected, neg nitrite). Pt was discharged on 1/15/19 from ED with antibiotocs CTX then cefuroxine.  No dysuria.  - F/u urine culture   - C/w Cefuroxime (Day 5). Consider stopping at Day 7 Pt pw positive UA from prev ED visit on 1/15/19 prior to adm on 1/18/19. +UA (large leukocyte esterase, hematuria, many WBCs, many bacteria detected, neg nitrite). Pt was discharged on 1/15/19 from ED with antibiotics CTX then cefuroxine.  No dysuria.  - F/u urine culture   - C/w Cefuroxime (Day 5). Consider stopping at Day 7

## 2019-01-19 NOTE — PROGRESS NOTE ADULT - PROBLEM SELECTOR PLAN 7
Patient AOx1 this morning. Oriented to name, not time or place.   - Get collateral from family/PCP  - Social work case management consult Patient AOx1 this morning. Oriented to name, not time or place. Pt's son, Chet Monae, reports that his mother has intermittent dementia with a high fall risk. She has memory loss. Nocturnal. Sleep disturbances progressively worsening. Sundowning with agitation.   - Get collateral from PCP  - Social work case management consult

## 2019-01-19 NOTE — PROGRESS NOTE ADULT - PROBLEM SELECTOR PLAN 4
Pt without known medical history of a specific arrythmia. EKG from this adm revealed likely atrial fibrillation   - Pt on Metoprolol succinate 50mg (home dose)   - C/w Toprol XL. rate controlling med Pt without known medical history of a specific arrythmia. EKG: atrial flutter with variable block or atrial fibrillation  - Pt on Metoprolol succinate 50mg (home dose)   - C/w Toprol XL. rate controlling med

## 2019-01-19 NOTE — PROGRESS NOTE ADULT - PROBLEM SELECTOR PLAN 1
H/o diabetes mellitus type II (lantus 24U, Januvia 25, Metformin 1000BID) with no change to diabetic medication regimen of late. Pt pw POCT Glu 29 symps of weakness, lightheadedness and pallor. A1c = 4.9%. Pt also pw anemia (H:H 10:32), which should be considered. Falsely low A1c levels seen with anemia.   - Holding antihyperglycemics  d/t symp hypoglycemia  - ISS  - FSq4 to monitor glucose. POCT Glu ranging from 89- 210.  - TSH wnl (3.673)  - F/u Cortisol, C peptide, Proinsulin, Metformin level, fructosamine H/o diabetes mellitus type II (lantus 24U, Januvia 25, Metformin 1000BID) with no change to diabetic medication regimen of late. Pt pw POCT Glu 29 symps of weakness, lightheadedness and pallor. A1c = 4.9%. Pt also pw anemia (H:H 10:32), which should be considered. Falsely low A1c levels seen with anemia. Will apply renal function to continue to normalize before restarting home dose meds.  - Holding antihyperglycemics  d/t symp hypoglycemia  - ISS  - FSq4 to monitor glucose. POCT Glu ranging from 89- 210.  - TSH wnl (3.673)  - F/u Cortisol, C peptide, Proinsulin, Metformin level, fructosamine H/o diabetes mellitus type II (lantus 24U, Januvia 25, Metformin 1000BID) with no change to diabetic medication regimen of late. Pt pw POCT Glu 29 symps of weakness, lightheadedness and pallor. A1c = 4.9%. Pt also pw anemia (H:H 10:32), which should be considered. Falsely low A1c levels seen with anemia. Will apply renal function to continue to normalize before restarting home dose meds.  - Holding antihyperglycemics  d/t symp hypoglycemia  - ISS  - FSq4 to monitor glucose. POCT Glu ranging from 89- 210.  - TSH wnl (3.673)  - F/u Cortisol, C peptide, Proinsulin, Metformin level, fructosamine    Family reports they are very cautious with antihyperglycemic regimen and strict with low sugar diet. Pt had received medications and vomited food prior to arriving in ED on 1/18/19 wth Glu reading of 39.

## 2019-01-19 NOTE — PROGRESS NOTE ADULT - PROBLEM SELECTOR PLAN 6
H/o open heart surgery with mitral valve replacement. H/o Coumadin 2.5mg PO daily use. Anticoagulants - multipurpose use for the mitral valve replacement in addition to the atrial fibrillation. [id number] score of 5 "mod-high" risk (age, sex, HTN, DM).   - H/o open heart surgery with mitral valve replacement. H/o Coumadin 2.5mg PO daily use. Anticoagulants - multipurpose use for the mitral valve replacement in addition to Afib vs Aflutter w/ variable block. [id number] score of 5 "mod-high" risk (age, sex, HTN, DM).   - Chest Xray confirms s/p open heart surgery with mitral valve replacement and sternal clips in place with fracture of 2nd sternal wire from top  - Will get collateral from PCP   - Patient on Heparin drip  - F/u PTT 16h   - aPTT 77.9 from 34.8   - Pt bridged to Coumadin   - Current INR 2.18 from 1.68  - Goal INR 2.5 - 3.5  - PT 25.2 from 19.0 H/o open heart surgery with mitral valve replacement. H/o Coumadin 2.5mg PO daily use. Anticoagulants - multipurpose use for the mitral valve replacement in addition to Afib vs Aflutter w/ variable block. [id number] score of 5 "mod-high" risk (age, sex, HTN, DM).   - Chest Xray confirms s/p open heart surgery with mitral valve replacement and sternal clips in place with fracture of 2nd sternal wire from top. Son Edie Rosenberg - reports the MVR was performed 9 years ago at [facility] on th [facility] with plan for revision at 10 year lynn.   - Will get collateral from PCP   - Patient on Heparin drip  - F/u PTT 16h   - aPTT 77.9 from 34.8   - Pt bridged to Coumadin   - Current INR 2.18 from 1.68  - Goal INR 2.5 - 3.5  - PT 25.2 from 19.0

## 2019-01-19 NOTE — PROGRESS NOTE ADULT - PROBLEM SELECTOR PLAN 10
1.  PCP Contacted on Admission: (Y/N) --> Name & Phone #: (YCharity Mei  2.  Date of Contact with PCP: 1/18/19  3.  PCP Contacted at Discharge: (Y/N)  4.  Summary of Handoff Given to PCP: n/a  5.  Post-Discharge Appointment Date and Location: [facility] 1.  PCP Contacted on Admission: (Y/N) --> Name & Phone #: (YCharity Mei  2.  Date of Contact with PCP: 1/18/19. tried to contact 1/19/19.  3.  PCP Contacted at Discharge: (Y/N)  4.  Summary of Handoff Given to PCP: n/a  5.  Post-Discharge Appointment Date and Location: [facility]

## 2019-01-19 NOTE — PROGRESS NOTE ADULT - SUBJECTIVE AND OBJECTIVE BOX
SUBJECTIVE / INTERVAL HPI: Patient seen and examined at bedside. 79yo F w/ PMHx DMII (Lantus 24, Januvia 25, Metformin 1000BID), senile dementia and MVR pw hypoglycemia. Her family brought her to the ED 1/16/19 after pt developed weakness, lightheadedness and pale appearance. They also reported foul-smelling urine and loose stools. Pt was found to have a UTI and hypoglycemia, treated with ceftriaxone/ cefuroxamine and D50, respectively. She returned yest 1/18/19 for similar symptoms with glucose at a level of 29 (given dextrose bumping her glu up to 54 --> 166). AOx0 this morning (reciting incorrect last name). Unable to say why she is in the hospital. Denies HA/N/V/F/C/CP. Admits to difficulty breathing, especially when lying down.     VITAL SIGNS:  Vital Signs Last 24 Hrs  T(C): 36.8 (19 Jan 2019 05:15), Max: 36.8 (19 Jan 2019 05:15)  T(F): 98.2 (19 Jan 2019 05:15), Max: 98.2 (19 Jan 2019 05:15)  HR: 76 (19 Jan 2019 05:15) (72 - 78)  BP: 111/72 (19 Jan 2019 05:15) (104/69 - 111/72)  BP(mean): --  RR: 18 (19 Jan 2019 05:15) (18 - 18)  SpO2: 100% (19 Jan 2019 05:15) (99% - 100%)    PHYSICAL EXAM:  General: well appearing, resting comfortably in bed and in no acute distress  HEENT: normocephalic, atraumatic, PERRL, anicteric sclera; no conjunctival pallor, mucus membranes moist  Neck: supple, no masses  Cardiovascular: +S1/S2, regular rate and rhythm; holosystolic murmur head at left lower sternal border; PMI  Respiratory: clear to auscultation bilaterally, no rhonchi, no wheeze, no rales  Gastrointestinal: soft, active bowel sounds, non-tender, non-distended  Extremities: Warm, dry; no edema, clubbing or cyanosis  Vascular: 2+ radial, DP pulses bilaterally  Neurological: AAOx0; no focal deficits  Psych: No agitation, behaviors appropriate with signs of dementia    MEDICATIONS:  MEDICATIONS  (STANDING):  dextrose 5%. 1000 milliLiter(s) (50 mL/Hr) IV Continuous <Continuous>  dextrose 50% Injectable 12.5 Gram(s) IV Push once  dextrose 50% Injectable 25 Gram(s) IV Push once  dextrose 50% Injectable 25 Gram(s) IV Push once  heparin  Infusion. 750 Unit(s)/Hr (7.5 mL/Hr) IV Continuous <Continuous>  metoprolol succinate ER 50 milliGRAM(s) Oral daily  simvastatin 40 milliGRAM(s) Oral at bedtime    MEDICATIONS  (PRN):  dextrose 40% Gel 15 Gram(s) Oral once PRN Blood Glucose LESS THAN 70 milliGRAM(s)/deciliter  glucagon  Injectable 1 milliGRAM(s) IntraMuscular once PRN Glucose LESS THAN 70 milligrams/deciliter      ALLERGIES:  Allergies    No Known Allergies    Intolerances        LABS:                        10.6   9.2   )-----------( 283      ( 18 Jan 2019 02:46 )             33.2     01-18    137  |  97  |  34<H>  ----------------------------<  176<H>  3.9   |  26  |  1.13    Ca    8.5      18 Jan 2019 11:22    TPro  7.5  /  Alb  3.3<L>  /  TBili  1.2  /  DBili  x   /  AST  50<H>  /  ALT  20  /  AlkPhos  66  01-18    PT/INR - ( 18 Jan 2019 02:46 )   PT: 19.0 sec;   INR: 1.68          PTT - ( 19 Jan 2019 01:43 )  PTT:42.8 sec    CAPILLARY BLOOD GLUCOSE      POCT Blood Glucose.: 210 mg/dL (18 Jan 2019 22:08)      RADIOLOGY & ADDITIONAL TESTS: Reviewed.

## 2019-01-20 NOTE — PROGRESS NOTE ADULT - PROBLEM SELECTOR PLAN 4
Pt without known medical history of a specific arrythmia. EKG: atrial flutter with variable block or atrial fibrillation  - Pt on Metoprolol succinate 50mg (home dose)   - C/w Toprol XL. rate controlling med

## 2019-01-20 NOTE — PROGRESS NOTE ADULT - PROBLEM SELECTOR PLAN 10
1.  PCP Contacted on Admission: (Y/N) --> Name & Phone #: (YCharity Mei  2.  Date of Contact with PCP: 1/18/19. tried to contact 1/19/19.  3.  PCP Contacted at Discharge: (Y/N)  4.  Summary of Handoff Given to PCP: n/a  5.  Post-Discharge Appointment Date and Location: [facility]

## 2019-01-20 NOTE — PROGRESS NOTE ADULT - SUBJECTIVE AND OBJECTIVE BOX
Patient is a 78y old  Female who presents with a chief complaint of Symptomatic Hypoglycemia (19 Jan 2019 08:30)      INTERVAL HPI/OVERNIGHT EVENTS: No acute events O/N. No complaints at this time.     Review of Systems: 12 point review of systems otherwise negative      MEDICATIONS  (STANDING):  cefuroxime   Tablet 250 milliGRAM(s) Oral every 12 hours  dextrose 5%. 1000 milliLiter(s) (50 mL/Hr) IV Continuous <Continuous>  dextrose 50% Injectable 12.5 Gram(s) IV Push once  dextrose 50% Injectable 25 Gram(s) IV Push once  dextrose 50% Injectable 25 Gram(s) IV Push once  heparin  Infusion. 750 Unit(s)/Hr (7.5 mL/Hr) IV Continuous <Continuous>  insulin glargine Injectable (LANTUS) 3 Unit(s) SubCutaneous at bedtime  insulin lispro (HumaLOG) corrective regimen sliding scale   SubCutaneous Before meals and at bedtime  metoprolol succinate ER 50 milliGRAM(s) Oral daily  simvastatin 40 milliGRAM(s) Oral at bedtime  warfarin 2.5 milliGRAM(s) Oral once    MEDICATIONS  (PRN):  dextrose 40% Gel 15 Gram(s) Oral once PRN Blood Glucose LESS THAN 70 milliGRAM(s)/deciliter  glucagon  Injectable 1 milliGRAM(s) IntraMuscular once PRN Glucose LESS THAN 70 milligrams/deciliter      Allergies    No Known Allergies    Intolerances          Vital Signs Last 24 Hrs  T(C): 36.7 (20 Jan 2019 08:57), Max: 36.9 (20 Jan 2019 05:33)  T(F): 98 (20 Jan 2019 08:57), Max: 98.4 (20 Jan 2019 05:33)  HR: 71 (20 Jan 2019 08:57) (67 - 72)  BP: 123/77 (20 Jan 2019 08:57) (105/71 - 123/77)  BP(mean): --  RR: 17 (20 Jan 2019 08:57) (17 - 18)  SpO2: 99% (20 Jan 2019 08:57) (98% - 99%)  CAPILLARY BLOOD GLUCOSE      POCT Blood Glucose.: 190 mg/dL (20 Jan 2019 12:35)  POCT Blood Glucose.: 139 mg/dL (20 Jan 2019 08:22)  POCT Blood Glucose.: 254 mg/dL (19 Jan 2019 22:27)  POCT Blood Glucose.: 205 mg/dL (19 Jan 2019 17:08)      01-19 @ 07:01 - 01-20 @ 07:00  --------------------------------------------------------  IN: 82.5 mL / OUT: 0 mL / NET: 82.5 mL    01-20 @ 07:01 - 01-20 @ 15:04  --------------------------------------------------------  IN: 7.5 mL / OUT: 0 mL / NET: 7.5 mL        Physical Exam:    General:  NAD  HEENT:  Nonicteric  CV:  RRR  Lungs:  CTA B/L, no wheezes, rales, rhonchi  Abdomen:  Soft, non-tender  Extremities:  No edema  Skin:  Warm and dry, no rashes  :  No villarreal  Neuro:  Nonfocal  No Restraints    LABS:                        10.5   6.9   )-----------( 244      ( 20 Jan 2019 06:14 )             32.7     01-20    137  |  100  |  25<H>  ----------------------------<  165<H>  4.5   |  24  |  1.18    Ca    9.3      20 Jan 2019 06:15  Phos  3.2     01-19  Mg     2.6     01-20    TPro  6.7  /  Alb  3.3  /  TBili  1.3<H>  /  DBili  x   /  AST  44<H>  /  ALT  23  /  AlkPhos  67  01-19    PT/INR - ( 20 Jan 2019 06:15 )   PT: 29.6 sec;   INR: 2.54          PTT - ( 20 Jan 2019 06:15 )  PTT:69.4 sec

## 2019-01-21 NOTE — DISCHARGE NOTE ADULT - PLAN OF CARE
Resolved You were admitted for a hypoglycemic episode. Your sugar was as low as 39. We has stopped your oral antihyperglycemics during your hospital stay and resolved the hypoglycemia issue. We gradually increased your insulin by starting you off on a sliding scale and administering a small amount of premeal insulin. You have a past medical history of diabetes mellitus. You take Januvia 25, Lantus 24, metformin 1000 BID at home. You are without known medical history of a specific arrythmia. The EKG test showed either an atrial flutter with variable block or atrial fibrillation. You are already on Metoprolol succinate 50mg (home dose). Please continue with this rate controlling medication. You have a past medical history of hyperlipidemia. Continue to take your home dose of Simvastatin 40mg PO daily. You had a mitral valve replacement 9 years ago. Please follow up with your cardiovascular surgeon at [facility] as needed. During your hospital stay, we initially placed you on a heparin drip and bridged you back to Coumadin. Continue your home dose of Coumadin 2.5mg PO daily. You have a past medical history of coronary artery disease. Continue your medications, Losartan 50mg PO daily and Furosemide 40mg PO daily. You have a recent history of a urinary tract infection. During your hospital stay, you finished 7 days of Cefuroxime. You did not complain of any urinary symptoms. If you develop any symptoms or issues regarding this matter please bring this to the attention of your primary care physician. You were admitted for a hypoglycemic episode. We stopped your oral antihyperglycemics during your hospital stay and resolved the hypoglycemia issue. We gradually increased your insulin by starting you on on a sliding scale and administering a small amount of premeal insulin. Continue your oral antihyperglycemics: You have a past medical history of diabetes mellitus. You take Januvia 25, Lantus 24, metformin 1000 BID at home. Continue the following antihyperglycemics at home: You have a recent history of a urinary tract infection. During your hospital stay, you finished 7 days of Cefuroxime. You did not complain of any urinary symptoms. If you develop any symptoms or issues regarding this matter please bring this to the attention of your primary care physician. Do not hesitate to go the emergency room in case of an emergency. You had a mitral valve replacement 9 years ago. Please follow up with your cardiovascular surgeon at [facility] as needed. During your hospital stay, we initially placed you on a heparin drip and bridged you back to Coumadin. Continue your home dose of Coumadin 2.5mg PO daily. You have a past medical history of coronary artery disease. Continue your medications, Losartan 50mg PO daily and Furosemide 40mg PO daily.    You have a past medical history of hyperlipidemia. Continue to take your home dose of Simvastatin 40mg PO daily. You were anemic during your hospital stay. We evaluated iron studies which were abnormal. We sent out special tests to further work you up for hemolytic anemia. The results are still pending. Please follow up with your primary care doctor. Your follow up appointment is January 31, 2019 at 2:00PM. You were admitted for a hypoglycemic episode. We stopped your oral antihyperglycemics during your hospital stay and resolved the hypoglycemia issue. We gradually increased your insulin by starting you on on a sliding scale and administering a small amount of premeal insulin. Continue your oral antihyperglycemics: Metformin and Januvia. You have a past medical history of diabetes mellitus. You take Januvia 25, Lantus 24, metformin 1000 BID at home. Continue the following antihyperglycemics at home: Metformin and Januvia. You were anemic during your hospital stay. We evaluated iron studies for which were within normal limits.  Additional work up was suggestive of a hemolytic anemic process for which a Jaxon tests and a peripheral smear were sent. The hemoglobin level has remained stable but some of these labs remain pending. Therefore you will be called with these results and recommended to follow up with a hematologist. You will need to follow up with your primary doctor for which your follow up appointment is January 31, 2019 at 2:00PM. You will need a referral. Below is provided the information for a hematologist Dr. Ramírez within our system for which you can call and make an appointment.

## 2019-01-21 NOTE — DISCHARGE NOTE ADULT - ADDITIONAL INSTRUCTIONS
Please follow up with your primary doctor for which the following appointment You have a follow up appointment scheduled for January 31, 2019 at 2:00 PM with Dr. Rosa Martell at [address] [place] [address],, [address], [place] [address]. Please call [phone] if you have any questions or concerns regarding your appointment. You have a follow up appointment scheduled for January 31, 2019 at 2:00 PM with Dr. Rosa Martell at [address] [place] [address],, [address], [place] [address]. Please call [phone] if you have any questions or concerns regarding your appointment.    Please get a referral from your PCP to follow up with Dr. Ramírez and make an appointment with her.

## 2019-01-21 NOTE — DISCHARGE NOTE ADULT - SECONDARY DIAGNOSIS.
Type 2 diabetes mellitus with diabetic dermatitis, with long-term current use of insulin Atrial flutter by electrocardiogram Hyperlipidemia, unspecified hyperlipidemia type Heart valve replaced CAD (coronary artery disease) Urinary tract infection with hematuria, site unspecified Anemia

## 2019-01-21 NOTE — PROGRESS NOTE ADULT - PROBLEM SELECTOR PLAN 6
KURT improved. BUN 22, Cr 1.02. WNL.   - Lasix held in setting of UTI and KURT  - Losartan held in setting of KURT  - Metformin held due to hypoglycemia and possible toxic effect    - Continue to monitor Chem 7

## 2019-01-21 NOTE — DISCHARGE NOTE ADULT - PATIENT PORTAL LINK FT
You can access the [facility] Patient Portal, offered by [facility], by registering with the following website: [email or web address]

## 2019-01-21 NOTE — DISCHARGE NOTE ADULT - MEDICATION SUMMARY - MEDICATIONS TO TAKE
I will START or STAY ON the medications listed below when I get home from the hospital:    losartan 50 mg oral tablet  -- 1 tab(s) by mouth once a day  -- Indication: For Hypertension    warfarin 2.5 mg oral tablet  -- 1 tab(s) by mouth once a day  -- Indication: For Mitral valve replaced and Atrial fibrillation    metFORMIN 1000 mg oral tablet  -- 1 tab(s) by mouth 2 times a day  -- Indication: For Diabetes Mellitus    Januvia 25 mg oral tablet  -- 1 tab(s) by mouth once a day  -- Indication: For Diabetes Mellitus    simvastatin 40 mg oral tablet  -- 1 tab(s) by mouth once a day (at bedtime)  -- Indication: For Prophylactic measure for diabetes    metoprolol succinate 50 mg oral tablet, extended release  -- 1 tab(s) by mouth once a day  -- Indication: For Atrial fibrillation by electrocardiogram    furosemide 40 mg oral tablet  -- 1 tab(s) by mouth once a day  -- Indication: For Mitral valve replaced

## 2019-01-21 NOTE — DISCHARGE NOTE ADULT - CARE PLAN
Principal Discharge DX:	Hypoglycemia  Goal:	Resolved  Assessment and plan of treatment:	You were admitted for a hypoglycemic episode. Your sugar was as low as 39. We has stopped your oral antihyperglycemics during your hospital stay and resolved the hypoglycemia issue. We gradually increased your insulin by starting you off on a sliding scale and administering a small amount of premeal insulin.  Secondary Diagnosis:	Type 2 diabetes mellitus with diabetic dermatitis, with long-term current use of insulin  Assessment and plan of treatment:	You have a past medical history of diabetes mellitus. You take Januvia 25, Lantus 24, metformin 1000 BID at home.  Secondary Diagnosis:	Atrial flutter by electrocardiogram  Assessment and plan of treatment:	You are without known medical history of a specific arrythmia. The EKG test showed either an atrial flutter with variable block or atrial fibrillation. You are already on Metoprolol succinate 50mg (home dose). Please continue with this rate controlling medication.  Secondary Diagnosis:	Hyperlipidemia, unspecified hyperlipidemia type  Assessment and plan of treatment:	You have a past medical history of hyperlipidemia. Continue to take your home dose of Simvastatin 40mg PO daily.  Secondary Diagnosis:	Heart valve replaced  Assessment and plan of treatment:	You had a mitral valve replacement 9 years ago. Please follow up with your cardiovascular surgeon at [facility] as needed. During your hospital stay, we initially placed you on a heparin drip and bridged you back to Coumadin. Continue your home dose of Coumadin 2.5mg PO daily.  Secondary Diagnosis:	CAD (coronary artery disease)  Assessment and plan of treatment:	You have a past medical history of coronary artery disease. Continue your medications, Losartan 50mg PO daily and Furosemide 40mg PO daily.  Secondary Diagnosis:	Urinary tract infection with hematuria, site unspecified  Assessment and plan of treatment:	You have a recent history of a urinary tract infection. During your hospital stay, you finished 7 days of Cefuroxime. You did not complain of any urinary symptoms. If you develop any symptoms or issues regarding this matter please bring this to the attention of your primary care physician. Principal Discharge DX:	Hypoglycemia  Goal:	Resolved  Assessment and plan of treatment:	You were admitted for a hypoglycemic episode. We stopped your oral antihyperglycemics during your hospital stay and resolved the hypoglycemia issue. We gradually increased your insulin by starting you on on a sliding scale and administering a small amount of premeal insulin. Continue your oral antihyperglycemics:  Secondary Diagnosis:	Type 2 diabetes mellitus with diabetic dermatitis, with long-term current use of insulin  Assessment and plan of treatment:	You have a past medical history of diabetes mellitus. You take Januvia 25, Lantus 24, metformin 1000 BID at home. Continue the following antihyperglycemics at home:  Secondary Diagnosis:	Atrial flutter by electrocardiogram  Assessment and plan of treatment:	You are without known medical history of a specific arrythmia. The EKG test showed either an atrial flutter with variable block or atrial fibrillation. You are already on Metoprolol succinate 50mg (home dose). Please continue with this rate controlling medication.  Secondary Diagnosis:	Hyperlipidemia, unspecified hyperlipidemia type  Assessment and plan of treatment:	You have a past medical history of hyperlipidemia. Continue to take your home dose of Simvastatin 40mg PO daily.  Secondary Diagnosis:	Heart valve replaced  Assessment and plan of treatment:	You had a mitral valve replacement 9 years ago. Please follow up with your cardiovascular surgeon at [facility] as needed. During your hospital stay, we initially placed you on a heparin drip and bridged you back to Coumadin. Continue your home dose of Coumadin 2.5mg PO daily.  Secondary Diagnosis:	CAD (coronary artery disease)  Assessment and plan of treatment:	You have a past medical history of coronary artery disease. Continue your medications, Losartan 50mg PO daily and Furosemide 40mg PO daily.  Secondary Diagnosis:	Urinary tract infection with hematuria, site unspecified  Assessment and plan of treatment:	You have a recent history of a urinary tract infection. During your hospital stay, you finished 7 days of Cefuroxime. You did not complain of any urinary symptoms. If you develop any symptoms or issues regarding this matter please bring this to the attention of your primary care physician. Principal Discharge DX:	Hypoglycemia  Goal:	Resolved  Assessment and plan of treatment:	You were admitted for a hypoglycemic episode. We stopped your oral antihyperglycemics during your hospital stay and resolved the hypoglycemia issue. We gradually increased your insulin by starting you on on a sliding scale and administering a small amount of premeal insulin. Continue your oral antihyperglycemics:  Secondary Diagnosis:	Type 2 diabetes mellitus with diabetic dermatitis, with long-term current use of insulin  Assessment and plan of treatment:	You have a past medical history of diabetes mellitus. You take Januvia 25, Lantus 24, metformin 1000 BID at home. Continue the following antihyperglycemics at home:  Secondary Diagnosis:	Atrial flutter by electrocardiogram  Assessment and plan of treatment:	You are without known medical history of a specific arrythmia. The EKG test showed either an atrial flutter with variable block or atrial fibrillation. You are already on Metoprolol succinate 50mg (home dose). Please continue with this rate controlling medication.  Secondary Diagnosis:	Hyperlipidemia, unspecified hyperlipidemia type  Assessment and plan of treatment:	You have a past medical history of hyperlipidemia. Continue to take your home dose of Simvastatin 40mg PO daily.  Secondary Diagnosis:	Heart valve replaced  Assessment and plan of treatment:	You had a mitral valve replacement 9 years ago. Please follow up with your cardiovascular surgeon at [facility] as needed. During your hospital stay, we initially placed you on a heparin drip and bridged you back to Coumadin. Continue your home dose of Coumadin 2.5mg PO daily.  Secondary Diagnosis:	CAD (coronary artery disease)  Assessment and plan of treatment:	You have a past medical history of coronary artery disease. Continue your medications, Losartan 50mg PO daily and Furosemide 40mg PO daily.  Secondary Diagnosis:	Urinary tract infection with hematuria, site unspecified  Assessment and plan of treatment:	You have a recent history of a urinary tract infection. During your hospital stay, you finished 7 days of Cefuroxime. You did not complain of any urinary symptoms. If you develop any symptoms or issues regarding this matter please bring this to the attention of your primary care physician. Do not hesitate to go the emergency room in case of an emergency. Principal Discharge DX:	Hypoglycemia  Goal:	Resolved  Assessment and plan of treatment:	You were admitted for a hypoglycemic episode. We stopped your oral antihyperglycemics during your hospital stay and resolved the hypoglycemia issue. We gradually increased your insulin by starting you on on a sliding scale and administering a small amount of premeal insulin. Continue your oral antihyperglycemics:  Secondary Diagnosis:	Type 2 diabetes mellitus with diabetic dermatitis, with long-term current use of insulin  Assessment and plan of treatment:	You have a past medical history of diabetes mellitus. You take Januvia 25, Lantus 24, metformin 1000 BID at home. Continue the following antihyperglycemics at home:  Secondary Diagnosis:	Atrial flutter by electrocardiogram  Assessment and plan of treatment:	You are without known medical history of a specific arrythmia. The EKG test showed either an atrial flutter with variable block or atrial fibrillation. You are already on Metoprolol succinate 50mg (home dose). Please continue with this rate controlling medication.  Secondary Diagnosis:	Heart valve replaced  Assessment and plan of treatment:	You had a mitral valve replacement 9 years ago. Please follow up with your cardiovascular surgeon at [facility] as needed. During your hospital stay, we initially placed you on a heparin drip and bridged you back to Coumadin. Continue your home dose of Coumadin 2.5mg PO daily.  Secondary Diagnosis:	CAD (coronary artery disease)  Assessment and plan of treatment:	You have a past medical history of coronary artery disease. Continue your medications, Losartan 50mg PO daily and Furosemide 40mg PO daily.    You have a past medical history of hyperlipidemia. Continue to take your home dose of Simvastatin 40mg PO daily.  Secondary Diagnosis:	Urinary tract infection with hematuria, site unspecified  Assessment and plan of treatment:	You have a recent history of a urinary tract infection. During your hospital stay, you finished 7 days of Cefuroxime. You did not complain of any urinary symptoms. If you develop any symptoms or issues regarding this matter please bring this to the attention of your primary care physician. Do not hesitate to go the emergency room in case of an emergency.  Secondary Diagnosis:	Anemia  Assessment and plan of treatment:	You were anemic during your hospital stay. We evaluated iron studies which were abnormal. We sent out special tests to further work you up for hemolytic anemia. The results are still pending. Please follow up with your primary care doctor. Your follow up appointment is January 31, 2019 at 2:00PM. Principal Discharge DX:	Hypoglycemia  Goal:	Resolved  Assessment and plan of treatment:	You were admitted for a hypoglycemic episode. We stopped your oral antihyperglycemics during your hospital stay and resolved the hypoglycemia issue. We gradually increased your insulin by starting you on on a sliding scale and administering a small amount of premeal insulin. Continue your oral antihyperglycemics: Metformin and Januvia.  Secondary Diagnosis:	Type 2 diabetes mellitus with diabetic dermatitis, with long-term current use of insulin  Assessment and plan of treatment:	You have a past medical history of diabetes mellitus. You take Januvia 25, Lantus 24, metformin 1000 BID at home. Continue the following antihyperglycemics at home: Metformin and Januvia.  Secondary Diagnosis:	Atrial flutter by electrocardiogram  Assessment and plan of treatment:	You are without known medical history of a specific arrythmia. The EKG test showed either an atrial flutter with variable block or atrial fibrillation. You are already on Metoprolol succinate 50mg (home dose). Please continue with this rate controlling medication.  Secondary Diagnosis:	Heart valve replaced  Assessment and plan of treatment:	You had a mitral valve replacement 9 years ago. Please follow up with your cardiovascular surgeon at [facility] as needed. During your hospital stay, we initially placed you on a heparin drip and bridged you back to Coumadin. Continue your home dose of Coumadin 2.5mg PO daily.  Secondary Diagnosis:	CAD (coronary artery disease)  Assessment and plan of treatment:	You have a past medical history of coronary artery disease. Continue your medications, Losartan 50mg PO daily and Furosemide 40mg PO daily.    You have a past medical history of hyperlipidemia. Continue to take your home dose of Simvastatin 40mg PO daily.  Secondary Diagnosis:	Urinary tract infection with hematuria, site unspecified  Assessment and plan of treatment:	You have a recent history of a urinary tract infection. During your hospital stay, you finished 7 days of Cefuroxime. You did not complain of any urinary symptoms. If you develop any symptoms or issues regarding this matter please bring this to the attention of your primary care physician. Do not hesitate to go the emergency room in case of an emergency.  Secondary Diagnosis:	Anemia  Assessment and plan of treatment:	You were anemic during your hospital stay. We evaluated iron studies which were abnormal. We sent out special tests to further work you up for hemolytic anemia. The results are still pending. Please follow up with your primary care doctor. Your follow up appointment is January 31, 2019 at 2:00PM. Principal Discharge DX:	Hypoglycemia  Goal:	Resolved  Assessment and plan of treatment:	You were admitted for a hypoglycemic episode. We stopped your oral antihyperglycemics during your hospital stay and resolved the hypoglycemia issue. We gradually increased your insulin by starting you on on a sliding scale and administering a small amount of premeal insulin. Continue your oral antihyperglycemics: Metformin and Januvia.  Secondary Diagnosis:	Type 2 diabetes mellitus with diabetic dermatitis, with long-term current use of insulin  Assessment and plan of treatment:	You have a past medical history of diabetes mellitus. You take Januvia 25, Lantus 24, metformin 1000 BID at home. Continue the following antihyperglycemics at home: Metformin and Januvia.  Secondary Diagnosis:	Atrial flutter by electrocardiogram  Assessment and plan of treatment:	You are without known medical history of a specific arrythmia. The EKG test showed either an atrial flutter with variable block or atrial fibrillation. You are already on Metoprolol succinate 50mg (home dose). Please continue with this rate controlling medication.  Secondary Diagnosis:	Heart valve replaced  Assessment and plan of treatment:	You had a mitral valve replacement 9 years ago. Please follow up with your cardiovascular surgeon at [facility] as needed. During your hospital stay, we initially placed you on a heparin drip and bridged you back to Coumadin. Continue your home dose of Coumadin 2.5mg PO daily.  Secondary Diagnosis:	CAD (coronary artery disease)  Assessment and plan of treatment:	You have a past medical history of coronary artery disease. Continue your medications, Losartan 50mg PO daily and Furosemide 40mg PO daily.    You have a past medical history of hyperlipidemia. Continue to take your home dose of Simvastatin 40mg PO daily.  Secondary Diagnosis:	Urinary tract infection with hematuria, site unspecified  Assessment and plan of treatment:	You have a recent history of a urinary tract infection. During your hospital stay, you finished 7 days of Cefuroxime. You did not complain of any urinary symptoms. If you develop any symptoms or issues regarding this matter please bring this to the attention of your primary care physician. Do not hesitate to go the emergency room in case of an emergency.  Secondary Diagnosis:	Anemia  Assessment and plan of treatment:	You were anemic during your hospital stay. We evaluated iron studies for which were within normal limits.  Additional work up was suggestive of a hemolytic anemic process for which a Jaxon tests and a peripheral smear were sent. The hemoglobin level has remained stable but some of these labs remain pending. Therefore you will be called with these results and recommended to follow up with a hematologist. You will need to follow up with your primary doctor for which your follow up appointment is January 31, 2019 at 2:00PM. You will need a referral. Below is provided the information for a hematologist Dr. Ramírez within our system for which you can call and make an appointment.

## 2019-01-21 NOTE — PROGRESS NOTE ADULT - PROBLEM SELECTOR PLAN 1
H/o diabetes mellitus type II (lantus 24U, Januvia 25, Metformin 1000BID). Pt pw POCT Glu 29 symps of weakness, lightheadedness and pallor. A1c = 4.9%. Symptoms improved. C peptide 1.9. Fructosamine 207.  - C/w ISS/Lispro  - Added 3U Lantus bedtime  - Will continue to adjust insulin and monitor glu  - F/u Cortisol, Proinsulin, Metformin level

## 2019-01-21 NOTE — PROGRESS NOTE ADULT - PROBLEM SELECTOR PLAN 10
1.  PCP Contacted on Admission: (Y/N) --> Name & Phone #: (ANDRES Mei  2.  Date of Contact with PCP: 1/18/19. tried to contact 1/19/19.  3.  PCP Contacted at Discharge: (Y/N)  4.  Summary of Handoff Given to PCP: n/a  5.  Post-Discharge Appointment Date and Location: [facility].

## 2019-01-21 NOTE — PROGRESS NOTE ADULT - ATTENDING COMMENTS
Cont to adjust insulin regimen, cont abx for UTI, cont coumadin for Mech MVR goal INR 3. Hgb dropped 1 gram but no evidence of bleeding, monitor CBC.
Adjusting DM meds  Heparin drip bridge to coumadin for mechanical MVR: goal INR 3  KURT improving  Obtain outpt records

## 2019-01-21 NOTE — PROGRESS NOTE ADULT - ASSESSMENT
77 yo F DM, HTN, senile dementia, Mitral valve replacement, presenting for symptomatic hypoglycemia.

## 2019-01-21 NOTE — PROGRESS NOTE ADULT - PROBLEM SELECTOR PLAN 3
Pt pw positive UA from prev ED visit on 1/15/19 prior to adm on 1/18/19. +UA (large leukocyte esterase, hematuria, many WBCs, many bacteria detected, neg nitrite). Pt was discharged on 1/15/19 from ED with antibiotics CTX then cefuroxine.  No dysuria.  - F/u urine culture - results in progress  - C/w Cefuroxime (Day 6). Consider stopping at Day 7. Pt pw positive UA from prev ED visit on 1/15/19 prior to adm on 1/18/19. +UA (large leukocyte esterase, hematuria, many WBCs, many bacteria detected, neg nitrite). Pt was discharged on 1/15/19 from ED with antibiotics CTX then cefuroxine.  No dysuria.  - F/u urine culture - results in progress  - C/w Cefuroxime (Day 6). Consider stopping at Day 7 (1/22/19)    #Anemia   - F/u 2PM CBC   - Monitor H+H   - Source of bleeding?

## 2019-01-21 NOTE — DISCHARGE NOTE ADULT - HOSPITAL COURSE
Patient is a 79 yo F Lithuanian-speaking DM (Januvia 25, Lantus 24, metformin 1000 BID), HTN, senile dementia, Mitral valve replacement, presenting for hypoglycemic episode. Patient was brought to the hospital after 1 week of developing weakness, felt lightheaded, and pallor.  Annalisa was orginially brought to [facility] in 1/16 due to malaise, pallor and lightheadness in addition to a UTI (treated with CTX and then cefuroxamine) and hypoglycemia for which she was given D50 and glucose levels recovered. She was sent home from the ER that day. However, one day PTA, [facility] After receiving Dextrose, her glucose increased to 54 and then 166. Patient was taken off all antihyperglycemic oral medications and placed on Lispro sliding scale. While she was improving we added Lantus premeal as needed. Patient's glucose has been around 139-272. Patient previously diagnosed with dementia AOX1 at baseline with an aide at home, 7 hrs for 7 days. Able to minimally complete ADLs. Patient finished 7 day course of CTX for UTI on 1/22. Patient is a 77 yo F Danish-speaking DM (Januvia 25, Lantus 24, metformin 1000 BID), HTN, senile dementia, Mitral valve replacement, presenting for hypoglycemic episode. Patient was brought to the hospital after 1 week of developing weakness, felt lightheaded, and pallor.  Annalisa was orginially brought to [facility] in 1/16 due to malaise, pallor and lightheadness in addition to a UTI (treated with CTX and then cefuroxamine) and hypoglycemia for which she was given D50 and glucose levels recovered. She was sent home from the ER that day. However, one day PTA, [facility] After receiving Dextrose, her glucose increased to 54 and then 166. Patient was taken off all antihyperglycemic oral medications and placed on Lispro sliding scale. While she was improving we added Lantus premeal as needed. Patient's glucose has been around 139-272. Patient previously diagnosed with dementia AOX1 at baseline with an aide at home, 7 hrs for 7 days. Able to minimally complete ADLs. Patient finished 7 day course of CTX for UTI on 1/22. Patient determined stable for home with home PT and re-instate 24 hour home health aide. Patient to follow up with her primary doctor. Patient is a 77 yo F Irish-speaking DM (Januvia 25, Lantus 24, metformin 1000 BID), HTN, senile dementia, Mitral valve replacement, presenting for hypoglycemic episode. Patient was brought to the hospital after 1 week of developing weakness, felt lightheaded, and pallor.  Annalisa was orginially brought to [facility] in 1/16 due to malaise, pallor and lightheadness in addition to a UTI (treated with CTX and then cefuroxamine) and hypoglycemia for which she was given D50 and glucose levels recovered. She was sent home from the ER that day. However, one [facility]TA, [facility] After receiving Dextrose, her glucose increased to 54 and then 166. Patient was taken off all antihyperglycemic oral medications and placed on Lispro sliding scale. While she was improving we added Lantus premeal as needed. Patient's glucose has been around 139-272. Patient previously diagnosed with dementia AOX1 at baseline with an aide at home, 7 hrs for 7 days. Able to minimally complete ADLs. Patient finished 7 day course of CTX for UTI on 1/22. Patient determined stable for home with home PT and re-instate 24 hour home health aide. Patient to follow up with her primary doctor on January 31, 2019 at 2:00 PM.

## 2019-01-21 NOTE — DISCHARGE NOTE ADULT - CARE PROVIDERS DIRECT ADDRESSES
,DirectAddress_Unknown ,DirectAddress_Unknown,[email or web address]

## 2019-01-21 NOTE — DISCHARGE NOTE ADULT - CARE PROVIDER_API CALL
leo winslow  [address] [place] [address], [address] [place] [address]  Phone: [phone]  Fax: [phone] Rosa Martell  [address],, [address] Floor, [place] [address]  Phone: [phone]  Fax: (       - Rosa Martell  [address], [address] Floor, [place] [address]  Phone: [phone]  Fax: (   )    Marilin Eckert), Internal Medicine  [address]  [address] [place] [address]  Phone: [phone]  Fax: [phone]

## 2019-01-21 NOTE — DISCHARGE NOTE ADULT - PROVIDER TOKENS
FREE:[LAST:[goldy],FIRST:[leo],PHONE:[phone]:[phone]],ADDRESS:[[address] [place] [address], [address] [place] [address]]] FREE:[LAST:[Jasbir],FIRST:[Rosa],PHONE:[phone]],FAX:[(   )    -],ADDRESS:[[address] [place] [address],, [address], [place] [address]]] FREE:[LAST:[Jasbir],FIRST:[Rosa],PHONE:[phone]],FAX:[(   )    -],ADDRESS:[[address] [place] [address],, [address], [place] [address]]],TOKEN:'[id number]'

## 2019-01-21 NOTE — PROGRESS NOTE ADULT - SUBJECTIVE AND OBJECTIVE BOX
SUBJECTIVE / INTERVAL HPI: Patient seen and examined at bedside. Used  services to communicate with patient. Denies pain, nausea, emesis, fever, chills, SOB and CP. Denies malaise, dizziness and lightheadedness. VSS. PAULINE overnight.    VITAL SIGNS:  Vital Signs Last 24 Hrs  T(C): 36.9 (21 Jan 2019 04:44), Max: 36.9 (20 Jan 2019 21:31)  T(F): 98.4 (21 Jan 2019 04:44), Max: 98.4 (20 Jan 2019 21:31)  HR: 68 (21 Jan 2019 04:44) (59 - 75)  BP: 128/78 (21 Jan 2019 04:44) (103/71 - 128/78)  BP(mean): --  RR: 17 (21 Jan 2019 04:44) (17 - 18)  SpO2: 97% (21 Jan 2019 04:44) (96% - 99%)    PHYSICAL EXAM:    General: well appearing elderly woman, resting comfortably in bed and in no acute distress  HEENT: normocephalic, atraumatic, PERRL, anicteric sclera; no conjunctival pallor, mucus membranes moist  Neck: supple, no masses  Cardiovascular: +S1/S2, regular rate and rhythm; holosystolic murmur  Respiratory: clear to auscultation bilaterally, no rhonchi, no wheeze, no rales  Gastrointestinal: soft, active bowel sounds, non-tender, non-distended  Genitourinary: no villarreal   Extremities: Warm, dry; no edema, clubbing or cyanosis  Vascular: 2+ radial, DP pulses bilaterally  Skin: Diabetic dermatitis of forehead  Neurological: AAOx3; no focal deficits    MEDICATIONS:  MEDICATIONS  (STANDING):  cefuroxime   Tablet 250 milliGRAM(s) Oral every 12 hours  dextrose 5%. 1000 milliLiter(s) (50 mL/Hr) IV Continuous <Continuous>  dextrose 50% Injectable 12.5 Gram(s) IV Push once  dextrose 50% Injectable 25 Gram(s) IV Push once  dextrose 50% Injectable 25 Gram(s) IV Push once  heparin  Infusion. 750 Unit(s)/Hr (7.5 mL/Hr) IV Continuous <Continuous>  insulin glargine Injectable (LANTUS) 3 Unit(s) SubCutaneous at bedtime  insulin lispro (HumaLOG) corrective regimen sliding scale   SubCutaneous Before meals and at bedtime  metoprolol succinate ER 50 milliGRAM(s) Oral daily  simvastatin 40 milliGRAM(s) Oral at bedtime    MEDICATIONS  (PRN):  dextrose 40% Gel 15 Gram(s) Oral once PRN Blood Glucose LESS THAN 70 milliGRAM(s)/deciliter  glucagon  Injectable 1 milliGRAM(s) IntraMuscular once PRN Glucose LESS THAN 70 milligrams/deciliter    ALLERGIES:  Allergies    No Known Allergies    Intolerances    LABS:                        9.5    9.2   )-----------( 230      ( 21 Jan 2019 06:51 )             30.2     01-21    138  |  102  |  22  ----------------------------<  156<H>  4.4   |  26  |  1.02    Ca    9.1      21 Jan 2019 06:49  Phos  3.2     01-19  Mg     1.8     01-21    TPro  6.7  /  Alb  3.3  /  TBili  1.3<H>  /  DBili  x   /  AST  44<H>  /  ALT  23  /  AlkPhos  67  01-19    PT/INR - ( 21 Jan 2019 06:50 )   PT: 29.9 sec;   INR: 2.57          PTT - ( 21 Jan 2019 06:50 )  PTT:67.4 sec    CAPILLARY BLOOD GLUCOSE      POCT Blood Glucose.: 183 mg/dL (20 Jan 2019 22:01)      RADIOLOGY & ADDITIONAL TESTS: Reviewed.

## 2019-01-21 NOTE — PROGRESS NOTE ADULT - PROBLEM SELECTOR PLAN 4
Pt without known medical history of a specific arrythmia. EKG: atrial flutter with variable block or atrial fibrillation  - Pt on Metoprolol succinate 50mg (home dose)   - C/w Toprol XL. rate controlling med.

## 2019-01-22 NOTE — PROGRESS NOTE ADULT - PROBLEM SELECTOR PLAN 3
Anemia. Patient pw Hgb 10.6 dropped 1pt 9.1: 28.0.  - Conitor to monitor CBC  - No kamaljit hematuria and hematochezia Anemia. Patient pw Hgb 10.6 dropped 1pt 9.1: 28.0.  - Continue to monitor CBC  - No kamaljit hematuria and hematochezia  - F/u Fe studies (tot Fe, ferritin, TIBC, transferrin, haptoglobin)

## 2019-01-22 NOTE — PROGRESS NOTE ADULT - SUBJECTIVE AND OBJECTIVE BOX
SUBJECTIVE / INTERVAL HPI: Patient seen and examined at bedside. Afebrile. VSS. NAD. AOx1. Communicated via . Denies n/v/f/sob/cp/f/c. Denies hematuria and hematochezia.     VITAL SIGNS:  Vital Signs Last 24 Hrs  T(C): 36.7 (22 Jan 2019 05:20), Max: 36.8 (21 Jan 2019 22:05)  T(F): 98.1 (22 Jan 2019 05:20), Max: 98.3 (21 Jan 2019 23:28)  HR: 68 (22 Jan 2019 05:20) (61 - 72)  BP: 121/76 (22 Jan 2019 05:20) (108/64 - 126/82)  BP(mean): --  RR: 18 (22 Jan 2019 05:20) (18 - 20)  SpO2: 97% (22 Jan 2019 05:20) (96% - 99%)    PHYSICAL EXAM:  General: well appearing elderly woman, resting comfortably in bed and in no acute distress  HEENT: normocephalic, atraumatic, PERRL, anicteric sclera; no conjunctival pallor, mucus membranes moist  Neck: supple, no masses  Cardiovascular: +S1/S2, regular rate and rhythm; holosystolic murmur  Respiratory: clear to auscultation bilaterally, no rhonchi, no wheeze, no rales  Gastrointestinal: soft, active bowel sounds, non-tender, non-distended  Genitourinary: no villarreal   Extremities: Warm, dry; no edema, clubbing or cyanosis  Vascular: 2+ radial, DP pulses bilaterally  Skin: Diabetic dermatitis of forehead  Neurological: AAOx3; no focal deficits    MEDICATIONS:  MEDICATIONS  (STANDING):  cefuroxime   Tablet 250 milliGRAM(s) Oral every 12 hours  dextrose 5%. 1000 milliLiter(s) (50 mL/Hr) IV Continuous <Continuous>  dextrose 50% Injectable 12.5 Gram(s) IV Push once  dextrose 50% Injectable 25 Gram(s) IV Push once  dextrose 50% Injectable 25 Gram(s) IV Push once  heparin  Infusion. 750 Unit(s)/Hr (7.5 mL/Hr) IV Continuous <Continuous>  insulin glargine Injectable (LANTUS) 3 Unit(s) SubCutaneous at bedtime  insulin lispro (HumaLOG) corrective regimen sliding scale   SubCutaneous Before meals and at bedtime  metoprolol succinate ER 50 milliGRAM(s) Oral daily  simvastatin 40 milliGRAM(s) Oral at bedtime    MEDICATIONS  (PRN):  dextrose 40% Gel 15 Gram(s) Oral once PRN Blood Glucose LESS THAN 70 milliGRAM(s)/deciliter  glucagon  Injectable 1 milliGRAM(s) IntraMuscular once PRN Glucose LESS THAN 70 milligrams/deciliter      ALLERGIES:  Allergies    No Known Allergies    Intolerances        LABS:                        9.1    8.1   )-----------( 243      ( 22 Jan 2019 05:52 )             28.0     01-22    136  |  103  |  25<H>  ----------------------------<  187<H>  4.4   |  23  |  1.01    Ca    9.2      22 Jan 2019 05:52  Mg     1.8     01-22      PT/INR - ( 22 Jan 2019 05:52 )   PT: 31.8 sec;   INR: 2.73          PTT - ( 22 Jan 2019 05:52 )  PTT:62.7 sec    CAPILLARY BLOOD GLUCOSE      POCT Blood Glucose.: 299 mg/dL (21 Jan 2019 22:06)      RADIOLOGY & ADDITIONAL TESTS: Reviewed. SUBJECTIVE / INTERVAL HPI: Patient seen and examined at bedside. Afebrile. VSS. NAD. AOx1. Communicated via . Denies n/v/f/sob/cp/f/c. Denies hematuria and hematochezia.     VITAL SIGNS:  Vital Signs Last 24 Hrs  T(C): 36.7 (22 Jan 2019 05:20), Max: 36.8 (21 Jan 2019 22:05)  T(F): 98.1 (22 Jan 2019 05:20), Max: 98.3 (21 Jan 2019 23:28)  HR: 68 (22 Jan 2019 05:20) (61 - 72)  BP: 121/76 (22 Jan 2019 05:20) (108/64 - 126/82)  BP(mean): --  RR: 18 (22 Jan 2019 05:20) (18 - 20)  SpO2: 97% (22 Jan 2019 05:20) (96% - 99%)    PHYSICAL EXAM:  General: well appearing elderly woman, resting comfortably in bed and in no acute distress  HEENT: normocephalic, atraumatic, PERRL, anicteric sclera; no conjunctival pallor, mucus membranes moist  Neck: supple, no masses  Cardiovascular: +S1/S2, regular rate and rhythm; holosystolic murmur  Respiratory: clear to auscultation bilaterally, no rhonchi, no wheeze, no rales  Gastrointestinal: soft, active bowel sounds, non-tender, non-distended, no rectal bleeding   Genitourinary: no villarreal, no vaginal bleeding   Extremities: Warm, dry; no edema, clubbing or cyanosis  Vascular: 2+ radial, DP pulses bilaterally  Skin: Diabetic dermatitis of forehead  Neurological: AAOx3; no focal deficits    MEDICATIONS:  MEDICATIONS  (STANDING):  cefuroxime   Tablet 250 milliGRAM(s) Oral every 12 hours  dextrose 5%. 1000 milliLiter(s) (50 mL/Hr) IV Continuous <Continuous>  dextrose 50% Injectable 12.5 Gram(s) IV Push once  dextrose 50% Injectable 25 Gram(s) IV Push once  dextrose 50% Injectable 25 Gram(s) IV Push once  heparin  Infusion. 750 Unit(s)/Hr (7.5 mL/Hr) IV Continuous <Continuous>  insulin glargine Injectable (LANTUS) 3 Unit(s) SubCutaneous at bedtime  insulin lispro (HumaLOG) corrective regimen sliding scale   SubCutaneous Before meals and at bedtime  metoprolol succinate ER 50 milliGRAM(s) Oral daily  simvastatin 40 milliGRAM(s) Oral at bedtime    MEDICATIONS  (PRN):  dextrose 40% Gel 15 Gram(s) Oral once PRN Blood Glucose LESS THAN 70 milliGRAM(s)/deciliter  glucagon  Injectable 1 milliGRAM(s) IntraMuscular once PRN Glucose LESS THAN 70 milligrams/deciliter      ALLERGIES:  Allergies    No Known Allergies    Intolerances        LABS:                        9.1    8.1   )-----------( 243      ( 22 Jan 2019 05:52 )             28.0     01-22    136  |  103  |  25<H>  ----------------------------<  187<H>  4.4   |  23  |  1.01    Ca    9.2      22 Jan 2019 05:52  Mg     1.8     01-22      PT/INR - ( 22 Jan 2019 05:52 )   PT: 31.8 sec;   INR: 2.73          PTT - ( 22 Jan 2019 05:52 )  PTT:62.7 sec    CAPILLARY BLOOD GLUCOSE      POCT Blood Glucose.: 299 mg/dL (21 Jan 2019 22:06)      RADIOLOGY & ADDITIONAL TESTS: Reviewed.

## 2019-01-22 NOTE — PROGRESS NOTE ADULT - PROBLEM SELECTOR PROBLEM 6
Urinary tract infection with hematuria, site unspecified Dementia without behavioral disturbance, unspecified dementia type

## 2019-01-22 NOTE — PROGRESS NOTE ADULT - PROBLEM SELECTOR PLAN 9
DDVT PPx: On heparin drip and bridging to coumadin  No GI PPx  CODE: FULL  Dispo: [facility]. DDVT PPx: On heparin drip and bridging to coumadin  No GI PPx  CODE: FULL  Dispo: [facility]. Plan for [facility]

## 2019-01-22 NOTE — PROGRESS NOTE ADULT - PROBLEM SELECTOR PROBLEM 5
Dementia without behavioral disturbance, unspecified dementia type Atrial fibrillation by electrocardiogram

## 2019-01-22 NOTE — PROGRESS NOTE ADULT - PROBLEM SELECTOR PLAN 10
1.  PCP Contacted on Admission: (Y/N) --> Name & Phone #: (ANDRES Mei  2.  Date of Contact with PCP: 1/18/19. tried to contact 1/19/19.  3.  PCP Contacted at Discharge: (Y/N)  4.  Summary of Handoff Given to PCP: n/a  5.  Post-Discharge Appointment Date and Location: [facility].

## 2019-01-22 NOTE — PROGRESS NOTE ADULT - PROBLEM SELECTOR PLAN 7
Pt pw positive UA from prev ED visit on 1/15/19 prior to adm on 1/18/19. +UA (large leukocyte esterase, hematuria, many WBCs, many bacteria detected, neg nitrite). Pt was discharged on 1/15/19 from ED with antibiotics CTX then cefuroxine.  No dysuria.  - F/u urine culture - results in progress  - C/w Cefuroxime (Day 6). Consider stopping at Day 7 (1/22/19) Pt pw positive UA from prev ED visit on 1/15/19 prior to adm on 1/18/19. +UA (large leukocyte esterase, hematuria, many WBCs, many bacteria detected, neg nitrite). Pt was discharged on 1/15/19 from ED with antibiotics CTX then cefuroxine.  No dysuria.  - F/u urine culture - results in progress  - C/w Cefuroxime (Day 7). Last dose 1400. Discontinue afterwards

## 2019-01-22 NOTE — PROGRESS NOTE ADULT - PROBLEM SELECTOR PLAN 4
Mechanical mitral valve: INR goal 3. INR 2.73.   - Cont heparin drip and coumadin bridge therapy Mechanical mitral valve: INR goal 3. INR 2.73.   - Cont heparin drip and coumadin bridge therapy  - Coumadin 3mg

## 2019-01-23 NOTE — PROGRESS NOTE ADULT - SUBJECTIVE AND OBJECTIVE BOX
SUBJECTIVE / INTERVAL HPI: Patient seen and examined at bedside. Patient resting comfortably in bed. PAULINE overnight. VSS. Baseline AOx1. Denies pain, n/v/f/c/sob/cp. Reports lack of appetite.     VITAL SIGNS:  Vital Signs Last 24 Hrs  T(C): 36.9 (23 Jan 2019 05:35), Max: 36.9 (23 Jan 2019 05:35)  T(F): 98.5 (23 Jan 2019 05:35), Max: 98.5 (23 Jan 2019 05:35)  HR: 65 (23 Jan 2019 05:35) (61 - 74)  BP: 112/68 (23 Jan 2019 05:35) (105/63 - 115/72)  BP(mean): --  RR: 18 (23 Jan 2019 05:35) (18 - 18)  SpO2: 98% (23 Jan 2019 05:35) (97% - 98%)    PHYSICAL EXAM:  General: well appearing elderly woman, resting comfortably in bed and in no acute distress  HEENT: normocephalic, atraumatic, PERRL, anicteric sclera; no conjunctival pallor, mucus membranes moist  Neck: supple, no masses  Cardiovascular: +S1/S2, regular rate and rhythm; holosystolic murmur  Respiratory: clear to auscultation bilaterally, no rhonchi, no wheeze, no rales  Gastrointestinal: soft, active bowel sounds, non-tender, non-distended, no rectal bleeding   Genitourinary: no villarreal, no vaginal bleeding; drenched in urine   Extremities: Warm, dry; no edema, clubbing or cyanosis  Vascular: 2+ radial, DP pulses bilaterally  Skin: Diabetic dermatitis of forehead  Neurological: AAOx1; no focal deficits    MEDICATIONS:  MEDICATIONS  (STANDING):  dextrose 5%. 1000 milliLiter(s) (50 mL/Hr) IV Continuous <Continuous>  dextrose 50% Injectable 12.5 Gram(s) IV Push once  dextrose 50% Injectable 25 Gram(s) IV Push once  dextrose 50% Injectable 25 Gram(s) IV Push once  heparin  Infusion. 750 Unit(s)/Hr (7.5 mL/Hr) IV Continuous <Continuous>  insulin glargine Injectable (LANTUS) 3 Unit(s) SubCutaneous at bedtime  insulin lispro (HumaLOG) corrective regimen sliding scale   SubCutaneous Before meals and at bedtime  magnesium sulfate  IVPB 2 Gram(s) IV Intermittent once  metoprolol succinate ER 50 milliGRAM(s) Oral daily  simvastatin 40 milliGRAM(s) Oral at bedtime    MEDICATIONS  (PRN):  dextrose 40% Gel 15 Gram(s) Oral once PRN Blood Glucose LESS THAN 70 milliGRAM(s)/deciliter  glucagon  Injectable 1 milliGRAM(s) IntraMuscular once PRN Glucose LESS THAN 70 milligrams/deciliter      ALLERGIES:  Allergies    No Known Allergies    Intolerances        LABS:                        8.9    6.9   )-----------( 229      ( 23 Jan 2019 05:47 )             27.9     01-23    138  |  103  |  24<H>  ----------------------------<  161<H>  4.4   |  23  |  0.98    Ca    8.9      23 Jan 2019 05:47  Mg     1.5     01-23    TPro  6.4  /  Alb  3.0<L>  /  TBili  1.3<H>  /  DBili  0.2  /  AST  40  /  ALT  32  /  AlkPhos  79  01-22    PT/INR - ( 23 Jan 2019 05:47 )   PT: 32.5 sec;   INR: 2.79          PTT - ( 23 Jan 2019 05:47 )  PTT:56.3 sec    CAPILLARY BLOOD GLUCOSE      POCT Blood Glucose.: 255 mg/dL (22 Jan 2019 23:43)      RADIOLOGY & ADDITIONAL TESTS: Reviewed.

## 2019-01-23 NOTE — PROGRESS NOTE ADULT - REASON FOR ADMISSION
Symptomatic Hypoglycemia

## 2019-01-23 NOTE — PROGRESS NOTE ADULT - PROBLEM SELECTOR PROBLEM 8
Nutrition, metabolism, and development symptoms
Prophylactic measure
Nutrition, metabolism, and development symptoms
Nutrition, metabolism, and development symptoms
Prophylactic measure

## 2019-01-23 NOTE — PROGRESS NOTE ADULT - PROBLEM SELECTOR PLAN 6
Mechanical mitral valve: INR goal 3. INR 2.79.   - Cont heparin drip and coumadin bridge therapy Mechanical mitral valve: INR goal 3. INR 2.79.   - Cont heparin drip and coumadin bridge therapy  - Warfarin 4mg PO

## 2019-01-23 NOTE — PROGRESS NOTE ADULT - PROBLEM SELECTOR PLAN 3
Pt pw positive UA from prev ED visit on 1/15/19 prior to adm on 1/18/19. +UA (large leukocyte esterase, hematuria, many WBCs, many bacteria detected, neg nitrite). Pt was discharged on 1/15/19 from ED with antibiotics CTX then cefuroxime.  No dysuria.  - Pt finished 7 day course of Cefuroxime

## 2019-01-23 NOTE — PROGRESS NOTE ADULT - PROBLEM SELECTOR PLAN 9
1.  PCP Contacted on Admission: (Y/N) --> Name & Phone #: (ANDRES Mei  2.  Date of Contact with PCP: 1/18/19. tried to contact 1/19/19.  3.  PCP Contacted at Discharge: (Y/N)  4.  Summary of Handoff Given to PCP: n/a  5.  Post-Discharge Appointment Date and Location: [facility].

## 2019-01-23 NOTE — PROGRESS NOTE ADULT - PROBLEM SELECTOR PROBLEM 2
Anemia, unspecified type
Type 2 diabetes mellitus with diabetic dermatitis, with long-term current use of insulin
Renal dysfunction
Renal dysfunction
Type 2 diabetes mellitus with diabetic dermatitis, with long-term current use of insulin

## 2019-01-23 NOTE — PROGRESS NOTE ADULT - PROBLEM SELECTOR PLAN 8
E: Replete goal K>4, Mg>2  N: Consistent carb and DASH diet.
DDVT PPx: On heparin drip and bridging to coumadin  No GI PPx  CODE: FULL  Dispo: [facility].
E: Replete goal K>4, Mg>2  N: Consistent carb and DASH diet
DDVT PPx: On heparin drip and bridging to coumadin  No GI PPx  CODE: FULL  Dispo: [facility]. Plan for Home with PT
F: D51/2NS 100 cc/hr, cont. lung checks, if patient eating, can d/c fluids  E: Replete goal K>4, Mg>2  N: Consistent carb and DASH diet

## 2019-01-23 NOTE — PROGRESS NOTE ADULT - PROBLEM SELECTOR PLAN 1
H/o diabetes mellitus type II (lantus 24U, Januvia 25, Metformin 1000BID). Pt pw hypoglycemic episode (Glu as low as 29) with symps of weakness, lightheadedness and pallor.  Hypoglycemic likely 2/2 overdosing antihyperglycemic home medications .A1c = 4.9%. Hypoglycemia now resolved since stopping all home meds. C peptide 1.9. Fructosamine 207.  - C/w ISS/Lispro  - 3U Lantus bedtime  - Will continue to adjust insulin and monitor glu  - F/u Cortisol, Proinsulin, Metformin level

## 2019-01-23 NOTE — PROGRESS NOTE ADULT - PROBLEM SELECTOR PLAN 2
Patient pw low Hg: Hct   - Continue to monitor CBC  - No kamaljit hematuria and hematochezia  - tot Fe, TIBC wnl  - Haptoglobin low <10, transferrin low 188, LDH elevated 610 with low RBC 3.34 and high retic % 2.7 Patient pw low Hg: Hct   - Continue to monitor CBC  - No kamaljit hematuria and hematochezia  - tot Fe, TIBC wnl  - Haptoglobin low <10, transferrin low 188, LDH elevated 610 with low RBC 3.34 and high retic % 2.7; retic index 0.8  - F/u Jaxon test  - F/u peripheral smear Patient pw low Hg: Hct. 1 pt drop since 1/20/19 12.4 Hgb from 13.4  - Continue to monitor CBC  - No kamaljit hematuria and hematochezia  - tot Fe, TIBC wnl  - Haptoglobin low <10, transferrin low 188, LDH elevated 610 with low RBC 3.34 and high retic % 2.7; retic index 0.8  - F/u Jaxon test  - F/u peripheral smear

## 2019-01-23 NOTE — PROGRESS NOTE ADULT - PROBLEM SELECTOR PROBLEM 1
Hypoglycemia
Type 2 diabetes mellitus with diabetic dermatitis, with long-term current use of insulin
Hypoglycemia

## 2019-01-23 NOTE — PROGRESS NOTE ADULT - PROBLEM SELECTOR PROBLEM 9
Prophylactic measure
Transition of care performed with sharing of clinical summary
Prophylactic measure
Nutrition, metabolism, and development symptoms
Prophylactic measure

## 2019-01-23 NOTE — PROGRESS NOTE ADULT - PROBLEM SELECTOR PLAN 5
Cont premeal and lantus
Mechanical mitral valve: INR goal 3. INR 2.54. Cont heparin drip to coumadin.
Pt without known medical history of a specific arrythmia. EKG: atrial flutter with variable block or atrial fibrillation  - Pt on Metoprolol succinate 50mg (home dose)   - C/w Toprol XL. rate controlling med.
H/o diabetes mellitus type II (lantus 24U, Januvia 25, Metformin 1000BID) with no change to diabetic medication regimen of late. Pt pw POCT Glu 29 symps of weakness, lightheadedness and pallor. A1c = 4.9%.  - ISS for now   - Pt may not require Insulin once discharged  - Will consider resuming home meds   - Holding all home antihyperglycemics d/t hypoglycemic episode    # Serum lactate increase  - Metformin toxicity possibly linked to elevated Lactate (downtrended from 3.6 to 1.7 Lactate)  - Metformin overdose vs Metformin with KURT vs hypoglycemic episode   - Lactate now wnl
Pt without known medical history of a specific arrythmia. EKG: atrial flutter with variable block or atrial fibrillation  - Pt on Metoprolol succinate 50mg (home dose)   - C/w Toprol XL. rate controlling med.

## 2019-07-01 PROBLEM — F03.90 UNSPECIFIED DEMENTIA WITHOUT BEHAVIORAL DISTURBANCE: Chronic | Status: ACTIVE | Noted: 2019-01-01

## 2019-07-01 PROBLEM — E78.5 HYPERLIPIDEMIA, UNSPECIFIED: Chronic | Status: ACTIVE | Noted: 2019-01-01

## 2019-07-01 NOTE — ED PROVIDER NOTE - OBJECTIVE STATEMENT
Patient arrives with CPR in progress, intubated. Per ems, hx of HTN, DM, end stage dementia, valvular heart repair 9 yrs ago, recent UTI, from home. Call received at 4 am, per family found unresponsive. CPR started at 4:15, continuous with a 5 min rosc, and continued pea/asystole. 3 epi given, and  per son at home. calcium and bicarb given by ems. in ED, CPR continued, several epi given, bicarb 1 amp given, FS 59 - D50 given, continued pulselessness. TOD is 5:33 am.

## 2019-07-01 NOTE — ED PROVIDER NOTE - CLINICAL SUMMARY MEDICAL DECISION MAKING FREE TEXT BOX
Patient arrives in cardiac arrest, s/p cpr multiple rounds, with no rosc, tod 5:33 am. family notified son po [email or web address] is present in the ed

## 2019-07-01 NOTE — ED PROVIDER NOTE - CRITICAL CARE PROVIDED
additional history taking/consult w/ pt's family directly relating to pts condition/documentation/direct patient care (not related to procedure)

## 2019-07-01 NOTE — ED ADULT NURSE NOTE - OBJECTIVE STATEMENT
pt arrived cpr in progress, christos PRADO, given c[place] @ 45 mins prior to arrival to the er, pt arrived cpr in progress, christos PRADO, given cpr in field downtime in field @ 45 mins prior to arrival to the er. cpr continued upon arrival with acls protocol

## 2020-09-18 NOTE — ED PROVIDER NOTE - ENMT NEGATIVE STATEMENT, MLM
Left detailed message on home care's identified voice mail that Dr Wegener will follow patient in home care.  Ora Rios RN   Ears: no ear pain and no hearing problems.Nose: no nasal congestion and no nasal drainage.Mouth/Throat: no dysphagia, no hoarseness and no throat pain.Neck: no lumps, no pain, no stiffness and no swollen glands.

## 2020-11-23 NOTE — ED PROVIDER NOTE - SECONDARY DIAGNOSIS.
Pt called c/o heavy and prolonged menses lasting 8 days. Occasionally skips menses.  Last visit here was 05/30/2019.  Pt has been treated with Provera in the past. Last u/s done 06/03/2019. Last emb done 09/06/2018. Offered an appt tomorrow, but wants one\" sometime after Thanksgiving\" Due for yearly. Please advise.    Metformin adverse reaction, initial encounter

## 2020-12-17 NOTE — PATIENT PROFILE ADULT - NSPROCHRONICPAINLOC_GEN_A_NUR
Piriformis Injection  (unilateral)  [facility]      PREOPERATIVE DIAGNOSIS:  Piriformis syndrome, myofascial pain syndrome.    POSTOPERATIVE DIAGNOSIS:  Same as preop diagnosis    PROCEDURE:   Diagnostic Piriformis Injection at the on the left     PRE-PROCEDURE DISCUSSION WITH PATIENT:    Risks and complications were discussed with the patient prior to starting the procedure and informed consent was obtained.  We discussed various topics including but not limited to bleeding, infection, injury, nerve injury, paralysis, coma, death, postprocedural painful flare-up, postprocedural site soreness, and a lack of pain relief.  We discussed the diagnostic aspect of piriformis injection and the potential therapy of this type of a trigger point injection.    SURGEON:  Steven Corcoran MD    REASON FOR PROCEDURE:      This patient does display a positive piriformis sign on exam, and has tenderness in the area that is overlying the distribution of the piriformis on exam under x-ray    SEDATION:  Versed 4mg & Fentanyl 50 mcg IV  ANESTHETIC:  Marcaine 0.5%  STEROID:  Methylprednisolone (DEPO MEDROL) 40mg/ml    DESCRIPTON OF PROCEDURE:  After obtaining informed consent, an I.V. was started in the preoperative area. The patient taken to the operating room and was placed in the prone position with a pillow under the abdomen.  All pressure points were well padded.  EKG, blood pressure, and pulse oximeter were monitored.  The lumbar area was prepped with Chloraprep and draped in a sterile fashion.     Under fluoroscopic guidance the point just lateral to the inferiormost point of the sacroiliac was visualized, which is also at the cephalad aspect of the sciatic notch.  Point was marked overlying the margin of the periosteum there.  This point was anesthetized with subcutaneous Lidocaine 1%, and then a spinal needle was advanced to contact this margin of the bone.  The needle was walked caudad off the bone and  advanced about 1-2mm further.  Resistance was felt upon entering this muscular trigger point.  Aspiration was checked and was negative.   Next, 1 mL of Omnipaque was injected. After seeing adequate spread in the corresponding piriformis muscle, flowing toward the greater trocanter, a total volume 2.5mL of injectate containing above mentioned local anesthetic solution was injected into the trigger point.    The needle was removed intact.  Vital signs were stable throughout.        ESTIMATED BLOOD LOSS:  minimal  SPECIMENS:  none    COMPLICATIONS:   No complications were noted., There was no indication of vascular uptake on live injection of contrast dye. and The patient did not have any signs of postprocedure numbness nor weakness.    TOLERANCE & DISCHARGE CONDITION:    The patient tolerated the procedure well.  The patient was transported to the recovery area without difficulties.  The patient was discharged to home under the care of family in stable and satisfactory condition.    PLAN OF CARE:  1. The patient was given our standard instruction sheet.  2. The patient will Return to clinic 2-3 wks.  3. The patient will resume all medications as per the medication reconciliation sheet.           left leg weakness

## 2021-11-19 NOTE — ED PROVIDER NOTE - MDM PATIENT STATEMENT FOR ADDL TREATMENT
[facility]
[address]
[place]   [address]                     CONSULTATION                  
_______________________________________________________________________________
 
Name:       MARIANNE WEATHERS                  Room #:         350-P       ADM IN  
..#:      [id number]                       Account #:      [id number]  
Admission:  11/18/21    Attend Phys:    Anthony Bui MD     
Discharge:              Date of Birth:  [birth year]  
                                                          Report #: [id number]
                                                                    [id number] 
_______________________________________________________________________________
THIS REPORT FOR:  
 
cc:  [facility] physician unknown
     [facility] physician unknown                                       
     Valente Bronson MD                                           ~
 
DATE OF SERVICE: 11/18/2021
 
INFECTIOUS DISEASE CONSULTATION
 
ATTENDING PHYSICIAN:  Dr. Bui.
 
REASON FOR EVALUATION:  COVID-19 infection.
 
HISTORY OF PRESENT ILLNESS:  Chart reviewed.  The patient examined.  This is a 
79-year-old with known history of chronic obstructive pulmonary disease with 
previous history of lung cancer.  He had received chemotherapy, although not 
currently for the past several months, who was confirmed to have COVID-19 
infection.  He has been ill for the last several days.  Admits to some dyspnea 
and some degree of cough that has been nonproductive.  He had a poor appetite 
and p.o. intake.  Does have some nausea with dry heaves, some loose stools, 
generally progressive weakness.  He was evaluated.  A chest x-ray was fairly 
unremarkable except for borderline saturations on room air.  Lactic acid is 1.0.
 Procalcitonin less than 0.05.  Blood cultures collected on admission are 
negative thus far.  He was empirically started on therapy with ceftriaxone and 
azithromycin.
 
ALLERGIES:  None known.
 
CURRENT MEDICATIONS:  Currently include atorvastatin, enoxaparin, thiamine, 
multivitamin, tamsulosin, donepezil, famotidine, zinc, cholecalciferol, ascorbic
acid, dexamethasone, budesonide, azithromycin, ceftriaxone.
 
PAST MEDICAL HISTORY:  As described above, has underlying COPD with previous 
history of lung cancer, presumably in remission, hyperlipidemia, BPH, dementia.
 
SOCIAL HISTORY:  Smoke cigarettes a pack a day, daily ethanol.
 
FAMILY HISTORY:  Noncontributory.
 
REVIEW OF SYSTEMS:  Otherwise, unremarkable.
 
PHYSICAL EXAMINATION:
GENERAL:  He appears chronically ill.  He is pleasant, cooperative, appears 
mildly encephalopathic, undernourished.
VITAL SIGNS:  Temperature 97.7, pulse 66, respirations 12, blood pressure 
 
 
 
[facility]
[address]
[place]   [address]                     CONSULTATION                  
_______________________________________________________________________________
 
Name:       MARIANNE WEATHERS                  Room #:         350-P       [facility] [place]  
.R.#:      [id number]                       Account #:      [id number]  
Admission:  11/18/21    Attend Phys:    Anthony Bui MD     
Discharge:              Date of Birth:  [birth year]  
                                                          Report #: [id number]
                                                                    [id number] 
_______________________________________________________________________________
 
127/39.
SKIN:  Warm, dry, no rashes.
HEENT:  Normocephalic.  Extraocular muscles intact.
NECK:  Supple.
LUNGS:  Overall diminished breath sounds, intermittent wheezes.  Few scattered 
coarse sounds.
HEART:  Regular.  I do not appreciate a murmur.
ABDOMEN:  Slightly distended, generally soft, nontender.
EXTREMITIES:  No cyanosis.
GENITOURINARY AND RECTAL:  Deferred.
 
LABORATORY DATA:  Electrolytes:  Sodium 135, potassium 3.6, chloride 102, 
bicarbonate is 20, anion gap of 13, BUN and creatinine 12 and 0.9, glucose of 
102, AST 44, ALT of 44, albumin 2.8, total protein 5.6.  Blood cultures sterile 
thus far.  CBC:  White count of 5.5, H and H 13.6 and 40.1, platelets of 140.  
Urinalysis, 0-5 white cells.  Influenza antigen was negative.  ProBNP of 566.  
Coronavirus testing was positive.  Chest x-ray as described above without acute 
process.
 
ASSESSMENT AND PLAN:  COVID-19 infection.  The patient has got underlying lung 
disease as well as history of lung cancer.  It is not clear the degree of 
immunosuppression, has not been on chemotherapy, although appears to be 
malnourished and appropriate risks for clinical deterioration.  This point, 
would hold off on remdesivir, although continue corticosteroids and vitamins.  
In the event of worsening, would institute this within the 10-day frame.  I will
continue empiric antibacterial therapy at this point, remains quite tenuous.  
Continue to monitor expectantly.
 
 
 
 
 
 
 
 
 
 
 
 
 
 
 
 
  <ELECTRONICALLY SIGNED>
   By: Valente rBonson MD           
  11/19/21     0843
D: 11/18/21 1402                           _____________________________________
T: 11/18/21 2004                           Valente Bronson MD             /nt Patient with one or more new problems requiring additional work-up/treatment.

## 2022-01-01 NOTE — PROGRESS NOTE ADULT - PROBLEM SELECTOR PLAN 2
This note was copied from the mother's chart.  Inpatient Lactation Consult    Maternal history:  Past Medical History:   Diagnosis Date   • Abnormal Pap smear of vagina 2020    colpo and wnl since   • History of depression    • History of postpartum depression     took meds pp for one year      Past Surgical History:   Procedure Laterality Date   • Colposcopy cervix & upper / adjacent vagina        OB History    Para Term  AB Living   2 2 2 0 0 3   SAB IAB Ectopic Molar Multiple Live Births   0 0 0 0 1 3      Current Facility-Administered Medications   Medication   • miSOPROStol (CYTOTEC) tablet 1,000 mcg   • methylergonovine (METHERGINE) injection 200 mcg   • carboprost (HEMABATE) injection 250 mcg   • ondansetron (ZOFRAN ODT) disintegrating tablet 4 mg    Or   • ondansetron (ZOFRAN) injection 4 mg   • naLOXone (NARCAN) injection 0.1 mg   • lipid rescue kit with fat emulsion (IntraLIPID, NUTRILIPID) 20 % injection 68.3 mL   • lipid rescue kit with fat emulsion (IntraLIPID, NUTRILIPID) 20 % injection 170.6 mL   • lipid rescue kit with fat emulsion (IntraLIPID, NUTRILIPID) 20 % injection 341.3 mL   • lipid rescue kit with fat emulsion (IntraLIPID, NUTRILIPID) 20 % injection 170.6 mL   • lipid rescue kit with fat emulsion (IntraLIPID, NUTRILIPID) 20 % injection 341.3 mL   • [START ON 2022] oxyCODONE (IMM REL) (ROXICODONE) tablet 5 mg   • measles-mumps-rubella vaccine 0.5 mL   • varicella virus (VARIVAX) live vaccine 0.5 mL   • diphtheria-pertussis (acellular)-tetanus (BOOSTRIX) 5-2.5-18.5 LF-MCG/0.5 vaccine 0.5 mL   • oxyTOCIN (PITOCIN) 30 Units in sodium chloride 0.9% 500 mL   • PRENATAL vitamin & mineral w/ folic acid 1 mg tablet 1 tablet   • hydroCORTisone (ANUSOL-HC) suppository 25 mg   • simethicone (MYLICON) tablet 125 mg   • calcium carbonate (TUMS) chewable tablet 500 mg   • prochlorperazine (COMPAZINE) tablet 5 mg   • prochlorperazine (COMPAZINE) injection 5 mg   • docusate  sodium-sennosides (SENOKOT S) 50-8.6 MG 2 tablet   • bisacodyl (DULCOLAX) suppository 10 mg   • magnesium hydroxide (MILK OF MAGNESIA) 400 MG/5ML suspension 30 mL   • polyethylene glycol (MIRALAX) packet 17 g   • lactated ringers infusion   • ketorolac injection 15 mg    Followed by   • [START ON 2022] ibuprofen (MOTRIN) tablet 600 mg   • acetaminophen (TYLENOL) tablet 1,000 mg         P - Knowledge deficit r/t breastfeeding    I- Met with patient, babies and families on rounds. Mother reports both newborns are latching and she just finished a feeding session with both babies. Reports these are her second and third children and that she had difficulty latching her first so she pumped and fed for 6 months. Offered to bring hospital grade breast pump to room if desires to use and mother in agreement. Demonstrated breast pump set up, use and care. Discussed breastfeeding multiples, positioning, asymmetric latch, deep versus shallow latch, nutritive vs non-nutritive suck, breast compression, typical  behavior, skin to skin, stressed 8-12 feeding/pumping sessions in 24 hours with no time limits while breastfeeding, breast milk production, supply/demand, frequency/duration, feeding cues, stomach capacity, signs of effective breastfeeding, and monitoring  output. Encouraged exclusive breast milk for , rooming in and to call for assistance as needed. Encouraged to transition to the couch or chair for feeding sessions. Education, handouts, and Lactation Department Helpline provided - encouraged to call  with any questions/concerns/need for assistance. Offered outpatient lactation consult after discharge. Reports having 2 breast pumps for home use. Questions answered. Elie DEE updated.    E- Mother verbalized understanding of plan of care and instructions.    Plan - Will continue to follow as needed.   Pw likely KURT. BUN improving from 41 to 25. Cr from 1.47 to 1.02. Waiting to hear back from PCP for further collateral. Renal dysfunction likely chronic based on previous adm. Metformin toxicity possibly linked to elevated Lactate (downtrended from 3.6 to 1.7 Lactate). Lactate now wnl. Kidney function improving since adm.   - Lasix held in setting of UTI and KURT  - Losartan held in setting of KUTR  - Metformin held due to hypoglycemia and possible toxic effect    - Continue to monitor BMP Pw likely KURT. BUN improving from 41 to 25. Cr from 1.47 to 1.02. Waiting to hear back from PCP for further collateral. Renal dysfunction likely chronic based on previous adm. Kidney function improving since adm.   - Lasix held in setting of UTI and KURT  - Losartan held in setting of KURT  - Metformin held due to hypoglycemia and possible toxic effect    - Continue to monitor BMP  - Allow renal function to continue to normalize

## 2022-07-14 NOTE — ED ADULT NURSE NOTE - DOES PATIENT HAVE ADVANCE DIRECTIVE
Norman Zazueta  1985  [id number]      Durable Medical Equipment Receipt      7/14/2022      Item description:      Crutches:    Quantity:     Brand Name:      Model Number:     Serial Number (if applicable):     Price: $      Other:    Quantity:      Brand Name:       Model Number:     Serial Number (if applicable):     Price: $         I the under assigned, certify that I have received the above described equipment.    Patient/Responsible Party Signature:      ____________________________________________                          DURABLE MEDICAL EQUIPMENT RECEIPT    CMS Medicare DMEPOS Suppliers Standards    Note: This list is an abbreviated version of the application certification standards, which every Medicare DMEPOS supplier must meet in order to obtain and retain their billing privileges. These standards, and there entity, are listed in the 42 C.F.R. pt. 424, sec 424.57(c) and are effective on December 11, 2000.  A supplier must disclose these standards to all customers/patients who are Medicare beneficiaries (standard 16).    1.  A supplier must be in compliance with all applicable Federal and State licensure and regulatory    requirements.  2.  A supplier must provide complete and accurate information on the DMEPOS supplier application.  Any changes to this information must be reported to the National Supplier Clearinghouse within 30 days.  3.  An authorized individual (one whose signature is binding) must sign the application for billing privileges.  4.  A supplier must fill orders from its own inventory, or must contract with other companies for the purchase    of items necessary to fill the order.  A supplier may not contract with any other entity that is currently   excluded from the Medicare program, any State health programs, or from any other Federal procurement or non-procurement programs.  5.  A supplier must advise beneficiaries that they may rent or purchase inexpensive or  routinely purchased durable medical equipment, and of the purchase option for capped rental equipment.  6.  A supplier must notify beneficiaries of warranty coverage and honor all warranties under applicable State law, and repair or replace free of charge Medicare covered items that are under warranty.  7.  A supplier must maintain a physical facility on an appropriate site.  8.  A supplier must permit CMS (formerly FA), or it agents to conduct on-site inspections to ascertain the supplier's compliance with these standards.  The supplier location must be accessible to beneficiaries during reasonable business hours, and must maintain a visible sign and posted hours of operation.   9. A supplier must maintain a primary business telephone listed under the same name of the business in a local directory or toll-free number available through directory assistance. The exclusive use of a beeper, answering machine or cell phone is prohibited.  10. A supplier must have comprehensive liability insurance and the amount of at least $300,000 that covers both the suppliers' place of business and all customers and employees of the supplier. If the supplier manufactures its own items, insurance must also cover product liability and completed operations.  11. A supplier must agree not to initiate telephone contact with beneficiaries, with a few exceptions allowed. The standard prohibits appliers recalling beneficiaries in order to solicit new business.  12. A supplier is responsible for delivery unless instruct beneficiaries on use of a Medicare covered items, and maintain proof of delivery.  13. A supplier must answer questions and respond to complaints of beneficiaries, and maintain documentation of such contacts.  14. A supplier must maintain a replace at no charge to repair directly, where through a service contract with another company, Medicare covered items and has rented to beneficiaries.  15. A supplier must accept returns  of substandard (less than full quality for the particular item) or unsuitable items (inappropriate for the beneficiary at this time it was fitted and rented or sold) from beneficiaries.  16. A supplier must disclose the supplier standards to each beneficiary to whom it supplies a Medicare covered item.   17. A supplier must disclose to the government any person having ownership, financial, or control interest in the supplier.  18. A supplier must not to convey or reassign a supplier number; i.e., the supplier may not sell or allow another entity to use its Medicare billing number.  19. The supplier must have a complaint to resolution protocol established to address the beneficiary complaints that relate to the standards. A record of these complaints must be maintained at the physical       facility.  20. Compliant records must include: the name, address, telephone number and health insurance claim       number of the beneficiary, a summary of a complaints, and any actions taken to resolve it.  21. A supplier must agree to furnish CMS (formerly FA) any information required by the Medicare statute and implementing regulations.   No

## 2022-09-22 NOTE — DISCHARGE NOTE ADULT - MEDICATION SUMMARY - MEDICATIONS TO STOP TAKING
Peng Advancement Flap Text: The defect edges were debeveled with a #15 scalpel blade.  Given the location of the defect, shape of the defect and the proximity to free margins a Peng advancement flap was deemed most appropriate.  Using a sterile surgical marker, an appropriate advancement flap was drawn incorporating the defect and placing the expected incisions within the relaxed skin tension lines where possible. The area thus outlined was incised deep to adipose tissue with a #15 scalpel blade.  The skin margins were undermined to an appropriate distance in all directions utilizing iris scissors. I will STOP taking the medications listed below when I get home from the hospital:    cefuroxime 250 mg oral tablet  -- 1 tab(s) by mouth every 12 hours   -- Finish all this medication unless otherwise directed by prescriber.  Medication should be taken with plenty of water.  Take with food or milk.    Lantus 100 units/mL subcutaneous solution  -- 24 unit(s) subcutaneous once a day (at bedtime)

## 2024-06-14 NOTE — PATIENT PROFILE ADULT - CAREGIVER NAME
"  Assessment & Plan     Cellulitis of right lower extremity  Healing well   Dicussed skin cares  Follow up in 1 month.     Diabetic foot infection (H)  Reviewed Dm cares    ESRD (end stage renal disease) on dialysis (H)  Follow up with consultant as planned.           BMI  Estimated body mass index is 35.97 kg/m  as calculated from the following:    Height as of this encounter: 1.676 m (5' 6\").    Weight as of this encounter: 101.1 kg (222 lb 14.2 oz).             Jean Carlos Epps is a 77 year old, presenting for the following health issues:  Follow Up (Wound on leg) and finger problem      6/14/2024    11:27 AM   Additional Questions   Roomed by Carie Sánchez   Accompanied by daughter Caroline LEWIS       Skin Lesion  Onset/Duration: weeks  Description  Location: right lower leg  Color: pink  Border description: sharp border  Character: round  Itching: no  Bleeding:  No  Intensity:  moderate  Progression of Symptoms:  improving  Accompanying signs and symptoms:   Bleeding: No  Scaling: YES     Chronic Kidney Disease Follow-up    Do you take any over the counter pain medicine?: No         Review of Systems  Constitutional, HEENT, cardiovascular, pulmonary, gi and gu systems are negative, except as otherwise noted.      Objective    Pulse 61   Temp 98.5  F (36.9  C) (Temporal)   Resp 15   Ht 1.676 m (5' 6\")   Wt 101.1 kg (222 lb 14.2 oz)   SpO2 97%   BMI 35.97 kg/m    Body mass index is 35.97 kg/m .  Physical Exam   GENERAL: alert and no distress  SKIN: 5 times 6 cm cicular healing patch with yellow granulation tissue    Office Visit on 06/03/2024   Component Date Value Ref Range Status    WBC Count 06/03/2024 6.4  4.0 - 11.0 10e3/uL Final    RBC Count 06/03/2024 3.13 (L)  3.80 - 5.20 10e6/uL Final    Hemoglobin 06/03/2024 9.9 (L)  11.7 - 15.7 g/dL Final    Hematocrit 06/03/2024 31.9 (L)  35.0 - 47.0 % Final    MCV 06/03/2024 102 (H)  78 - 100 fL Final    MCH 06/03/2024 31.6  26.5 - 33.0 pg Final    MCHC " 06/03/2024 31.0 (L)  31.5 - 36.5 g/dL Final    RDW 06/03/2024 13.7  10.0 - 15.0 % Final    Platelet Count 06/03/2024 176  150 - 450 10e3/uL Final    Sodium 06/03/2024 143  135 - 145 mmol/L Final    Reference intervals for this test were updated on 09/26/2023 to more accurately reflect our healthy population. There may be differences in the flagging of prior results with similar values performed with this method. Interpretation of those prior results can be made in the context of the updated reference intervals.     Potassium 06/03/2024 5.1  3.4 - 5.3 mmol/L Final    Carbon Dioxide (CO2) 06/03/2024 25  22 - 29 mmol/L Final    Anion Gap 06/03/2024 14  7 - 15 mmol/L Final    Urea Nitrogen 06/03/2024 55.8 (H)  8.0 - 23.0 mg/dL Final    Creatinine 06/03/2024 7.00 (H)  0.51 - 0.95 mg/dL Final    GFR Estimate 06/03/2024 6 (L)  >60 mL/min/1.73m2 Final    Calcium 06/03/2024 10.8 (H)  8.8 - 10.2 mg/dL Final    Chloride 06/03/2024 104  98 - 107 mmol/L Final    Glucose 06/03/2024 159 (H)  70 - 99 mg/dL Final    Alkaline Phosphatase 06/03/2024 58  40 - 150 U/L Final    AST 06/03/2024 15  0 - 45 U/L Final    Reference intervals for this test were updated on 6/12/2023 to more accurately reflect our healthy population. There may be differences in the flagging of prior results with similar values performed with this method. Interpretation of those prior results can be made in the context of the updated reference intervals.    ALT 06/03/2024 11  0 - 50 U/L Final    Reference intervals for this test were updated on 6/12/2023 to more accurately reflect our healthy population. There may be differences in the flagging of prior results with similar values performed with this method. Interpretation of those prior results can be made in the context of the updated reference intervals.      Protein Total 06/03/2024 7.0  6.4 - 8.3 g/dL Final    Albumin 06/03/2024 3.8  3.5 - 5.2 g/dL Final    Bilirubin Total 06/03/2024 0.2  <=1.2 mg/dL Final     Erythrocyte Sedimentation Rate 06/03/2024 101 (H)  0 - 30 mm/hr Final    CRP Inflammation 06/03/2024 20.80 (H)  <5.00 mg/L Final    Culture 06/03/2024 2+ Staphylococcus aureus (A)   Final    Culture 06/03/2024 4+ Corynebacterium striatum (A)   Final    Comment: Identification obtained by MALDI-TOF mass spectrometry research use only database. Test characteristics determined and verified by the [facility].  Susceptibilities not routinely done, refer to antibiogram to view typic                           al susceptibility profiles    Culture 06/03/2024 2+ Staphylococcus caprae (A)   Final    Comment: Identification obtained by MALDI-TOF mass spectrometry research use only database. Test characteristics determined and verified by the [facility].  Susceptibilities not routinely done, refer to antibiogram to view typic                           al susceptibility profiles    Gram Stain Result 06/03/2024 3+ Gram positive bacilli resembling diphtheroids (A)   Final    Gram Stain Result 06/03/2024 3+ Gram positive cocci (A)   Final    Gram Stain Result 06/03/2024 1+ WBC seen (A)   Final           Signed Electronically by: Noam Jackson MD     Chet Monae

## 2024-08-06 NOTE — ED PROVIDER NOTE - CROS ED PSYCH ALL NEG
negative...
Quality 226: Preventive Care And Screening: Tobacco Use: Screening And Cessation Intervention: Patient screened for tobacco use and is an ex/non-smoker
Quality 130: Documentation Of Current Medications In The Medical Record: Current Medications Documented
Detail Level: Detailed
Quality 431: Preventive Care And Screening: Unhealthy Alcohol Use - Screening: Patient not identified as an unhealthy alcohol user when screened for unhealthy alcohol use using a systematic screening method

## 2024-09-13 NOTE — ED ADULT NURSE NOTE - IS THE PATIENT ABLE TO BE SCREENED?
No [Visual inspection, sensory exam] : Foot exam, including visual inspection, sensory exam with mono filament, and pulse exam, was performed within the last 12 months

## 2024-10-24 NOTE — ED ADULT NURSE NOTE - PRO INTERPRETER NEED 2
09/20/2024 09/20/2024 traMADol HCL (Tablet, Extended Release, 24 H) 30.0 30 200 MG 40.0 [employer] Medicare 0 / 0 PA   08/28/2024 08/28/2024 traMADol HCL (Tablet) 40.0 10 100 MG 80.0 [facility] LICONA [facility] Private Pay 0 / 0 PA   1 [id number] 07/30/2024 05/14/2024 traMADol HCL (Tablet, Extended Release, 24 H) 30.0 30 200 MG 40.0 [employer]   English